# Patient Record
Sex: MALE | Employment: STUDENT | URBAN - METROPOLITAN AREA
[De-identification: names, ages, dates, MRNs, and addresses within clinical notes are randomized per-mention and may not be internally consistent; named-entity substitution may affect disease eponyms.]

---
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
## 2021-12-07 ENCOUNTER — OFFICE VISIT (OUTPATIENT)
Dept: FAMILY MEDICINE CLINIC | Facility: CLINIC | Age: 21
End: 2021-12-07
Payer: COMMERCIAL

## 2021-12-07 VITALS
TEMPERATURE: 97.1 F | HEIGHT: 69 IN | RESPIRATION RATE: 16 BRPM | WEIGHT: 214.6 LBS | HEART RATE: 84 BPM | OXYGEN SATURATION: 99 % | SYSTOLIC BLOOD PRESSURE: 122 MMHG | DIASTOLIC BLOOD PRESSURE: 62 MMHG | BODY MASS INDEX: 31.78 KG/M2

## 2021-12-07 DIAGNOSIS — S76.012A HIP STRAIN, LEFT, INITIAL ENCOUNTER: Primary | ICD-10-CM

## 2021-12-07 PROCEDURE — 99213 OFFICE O/P EST LOW 20 MIN: CPT | Performed by: FAMILY MEDICINE

## 2021-12-07 PROCEDURE — 1036F TOBACCO NON-USER: CPT | Performed by: FAMILY MEDICINE

## 2021-12-07 PROCEDURE — 3008F BODY MASS INDEX DOCD: CPT | Performed by: FAMILY MEDICINE

## 2021-12-07 PROCEDURE — 3725F SCREEN DEPRESSION PERFORMED: CPT | Performed by: FAMILY MEDICINE

## 2021-12-07 RX ORDER — NAPROXEN 500 MG/1
500 TABLET ORAL 2 TIMES DAILY WITH MEALS
Qty: 28 TABLET | Refills: 0 | Status: SHIPPED | OUTPATIENT
Start: 2021-12-07

## 2022-02-19 ENCOUNTER — HOSPITAL ENCOUNTER (OUTPATIENT)
Dept: RADIOLOGY | Facility: HOSPITAL | Age: 22
Discharge: HOME | End: 2022-02-19
Attending: FAMILY MEDICINE
Payer: COMMERCIAL

## 2022-02-19 DIAGNOSIS — S76.012A HIP STRAIN, LEFT, INITIAL ENCOUNTER: ICD-10-CM

## 2022-02-19 PROCEDURE — 73502 X-RAY EXAM HIP UNI 2-3 VIEWS: CPT

## 2022-03-03 ENCOUNTER — EVALUATION (OUTPATIENT)
Dept: PHYSICAL THERAPY | Facility: CLINIC | Age: 22
End: 2022-03-03
Payer: COMMERCIAL

## 2022-03-03 DIAGNOSIS — S76.012A HIP STRAIN, LEFT, INITIAL ENCOUNTER: ICD-10-CM

## 2022-03-03 DIAGNOSIS — S76.012D HIP STRAIN, LEFT, SUBSEQUENT ENCOUNTER: Primary | ICD-10-CM

## 2022-03-03 PROCEDURE — 97162 PT EVAL MOD COMPLEX 30 MIN: CPT | Performed by: PHYSICAL THERAPIST

## 2022-03-03 NOTE — PROGRESS NOTES
PT Evaluation     Today's date: 3/3/2022  Patient name: Laz Hui  : 2000  MRN: [id number]  Referring provider: Claven Brittle, MD  Dx:   Encounter Diagnosis     ICD-10-CM    1  Hip strain, left, subsequent encounter  S76 012D    2  Hip strain, left, initial encounter  S76 012A Ambulatory referral to Physical Therapy                  Assessment  Assessment details: Laz Hui is a 22 y o  male who presents to the clinic with complaints of left hip pain spanning 2+ months  The patient presents with the above listed impairments  He demonstrates decreased strength globally in his left lower extremity as well as noting discomfort in his anterior hip  He is limited with bending and lifting, getting in/out of his car, and running  He is eager to decrease his pain and should benefit from skilled physical therapy    Thank you for the referral       Impairments: abnormal muscle firing, abnormal or restricted ROM, activity intolerance, impaired physical strength, lacks appropriate home exercise program, pain with function, poor posture  and poor body mechanics  Understanding of Dx/Px/POC: good   Prognosis: good    Goals  Impairment Goals  - Decrease pain by 50% in 4 weeks  - Increase left lower extremity strength [employer] to 4+/5 in 4 weeks    Functional Goals  - Return to Prior Level of Function in 4 weeks  - Patient will be independent with HEP in 4 weeks  - Patient will be able to get in/out of his car with decreased pain in 4 weeks  - Patient will be able to bend and  a box with decreased pain in 4 weeks  - Patient will be able to run and pivot with decreased pain in 4 weeks      Plan  Patient would benefit from: skilled physical therapy  Planned modality interventions: cryotherapy, thermotherapy: hydrocollator packs and TENS  Planned therapy interventions: home exercise program, graded exercise, functional ROM exercises, flexibility, body mechanics training, postural training, patient education, therapeutic activities, therapeutic exercise, manual therapy, joint mobilization and neuromuscular re-education  Frequency: 2x week  Duration in weeks: 4  Treatment plan discussed with: patient and PCP        Subjective Evaluation    History of Present Illness  Mechanism of injury: HPI:  Patient notes left hip pain spanning over 2 months  Patient notes that he lifts weights 5x / week and feels he may have hurt his hip when lifting  He notes difficulty with bending, lifting, and running  He went to see his PCP where x-rays were ordered which were (-) for any pathology  He was then referred to physical therapy  Patient does not note any radicular symptoms or neurologic changes  Pain Location:  L hip  Occupation:  Works at [employer]  Prior Functional Limitations: Independent prior   AGG:  Squatting, bending, running, WB on single leg   Ease:  Pain Medication   Patient Goals:  "I want to be able to run    Pain  Current pain ratin  At best pain ratin  At worst pain ratin  Quality: "feels like a shocking in my hip"           Objective     Tenderness     Additional Tenderness Details  Tenderness to palpation anterior hip / hip flexor / proximal quad insertion     Active Range of Motion   Left Hip   Flexion: [place] [place]  External rotation (90/90): [facility] [place]  Internal rotation (90/90):  [facility] [place]    Additional Active Range of Motion Details  Lumbar Flexion:  Full w/ discomfort  Lumbar Extension:  75% w/o pain  Side bend:  50% bilaterally  Rotation:  WNL bilaterally     Strength/Myotome Testing     Lumbar     Right   Normal strength    Left Hip   Planes of Motion   Flexion: 4  Extension: 3+  Abduction: 3+    Left Knee   Extension: 4-    Left Ankle/Foot   Dorsiflexion: 5  Great toe extension: 5               Diagnosis:    Precautions:    Manuals        PROM        Mobs        IASTM                        There Ex        Bike        Prone Quad / Hip Flexor Stretch        Prone On Elbows / Prone Press Ups        It Band Stretch                        Neruo Re-Ed        Bridge        SL Hip ABD        Clamshell        Lateral Tap Down                                                                                       Ther Act                                         Modalities             CP PRN

## 2022-03-10 ENCOUNTER — OFFICE VISIT (OUTPATIENT)
Dept: PHYSICAL THERAPY | Facility: CLINIC | Age: 22
End: 2022-03-10
Payer: COMMERCIAL

## 2022-03-10 DIAGNOSIS — S76.012D HIP STRAIN, LEFT, SUBSEQUENT ENCOUNTER: Primary | ICD-10-CM

## 2022-03-10 DIAGNOSIS — S76.012A HIP STRAIN, LEFT, INITIAL ENCOUNTER: ICD-10-CM

## 2022-03-10 PROCEDURE — 97110 THERAPEUTIC EXERCISES: CPT

## 2022-03-10 PROCEDURE — 97112 NEUROMUSCULAR REEDUCATION: CPT

## 2022-03-10 PROCEDURE — 97140 MANUAL THERAPY 1/> REGIONS: CPT

## 2022-03-10 NOTE — PROGRESS NOTES
Daily Note     Today's date: 3/10/2022  Patient name: Kalpana Moore  : 2000  MRN: [id number]  Referring provider: Amelie Gallegos MD  Dx:   Encounter Diagnosis     ICD-10-CM    1  Hip strain, left, subsequent encounter  S76 012D    2  Hip strain, left, initial encounter  S76 012A                   Subjective: Patient reports, "I have been feeling it  It's gotten better at night, but I still feel it during the day"  Objective: See treatment diary below      Assessment: Tolerated treatment well  Patient would benefit from continued PT  Patient did well with his first PT session  He had difficulty performing hip abduction and hip ER exercises due to muscular weakness  Little to no relief noted with hip flexor stretching, but his pain improved with IASTM  Patient has back to back appointments;  assess his response at next session  Continue to progress as able  Plan: Continue per plan of care        Diagnosis:    Precautions:    Manuals 3/10        PROM Piriformis stretch, Hip flexor       Mobs        IASTM Hip Flexor                        There Ex        Bike        Prone Quad / Hip Flexor Stretch 30" x 3         Prone On Elbows / Prone Press Ups        It Band Stretch        Leg Press  140# 3x10                Neruo Re-Ed        Bridge 50% ROM  2x10        SL Hip ABD 10x        Clamshell Green 15x        Lateral Tap Down        SLB  With plyotoss: green 10x                                                                               Ther Act                                         Modalities             CP PRN

## 2022-03-11 ENCOUNTER — OFFICE VISIT (OUTPATIENT)
Dept: PHYSICAL THERAPY | Facility: CLINIC | Age: 22
End: 2022-03-11
Payer: COMMERCIAL

## 2022-03-11 DIAGNOSIS — S76.012A HIP STRAIN, LEFT, INITIAL ENCOUNTER: ICD-10-CM

## 2022-03-11 DIAGNOSIS — S76.012D HIP STRAIN, LEFT, SUBSEQUENT ENCOUNTER: Primary | ICD-10-CM

## 2022-03-11 PROCEDURE — 97140 MANUAL THERAPY 1/> REGIONS: CPT

## 2022-03-11 PROCEDURE — 97112 NEUROMUSCULAR REEDUCATION: CPT

## 2022-03-11 PROCEDURE — 97110 THERAPEUTIC EXERCISES: CPT

## 2022-03-11 NOTE — PROGRESS NOTES
Daily Note     Today's date: 3/11/2022  Patient name: Shona Lui  : 2000  MRN: [id number]  Referring provider: Nathalia Menezes MD  Dx:   Encounter Diagnosis     ICD-10-CM    1  Hip strain, left, subsequent encounter  S76 012D    2  Hip strain, left, initial encounter  S76 012A                   Subjective: Patient reports, "It felt a little better when I woke up this morning"  Objective: See treatment diary below      Assessment: Tolerated treatment well  Patient would benefit from continued PT  Patient notes that he felt better this morning when he woke up  He still notes hip flexor pain with prolonged SLS and with weight transfer from one leg to the other  He notes that his hip feels like it will "buckle" due to instability  Better able to tolerate glute strengthening today  His pain decreased from 10 to 3/10 with IASTM  He also had relief with standing hip flexor stretch  Continue to progress as able  Plan: Continue per plan of care        Diagnosis:    Precautions:    Manuals 3/10  3/11      PROM Piriformis stretch, Hip flexor       Mobs        IASTM Hip Flexor  Hip Flexor                       There Ex        Bike        Prone Quad / Hip Flexor Stretch 30" x 3   30" x 3    Standin" x 3       Prone On Elbows / Prone Press Ups  5" x 10       It Band Stretch        Leg Press  140# 3x10                Neruo Re-Ed        Bridge 50% ROM  2x10  50% ROM 10x       SL Hip ABD 10x        Clamshell Green 15x  [place], 3x10       Lateral Tap Down  2 in, 10x       SLB  With plyotoss: green 10x  With plyotoss: green 10x      Side steps   [place], 2 laps       Wobble board         RDL   Attempted - pain and instability                                                      Ther Act                                         Modalities             CP PRN

## 2022-03-15 ENCOUNTER — OFFICE VISIT (OUTPATIENT)
Dept: PHYSICAL THERAPY | Facility: CLINIC | Age: 22
End: 2022-03-15
Payer: COMMERCIAL

## 2022-03-15 DIAGNOSIS — S76.012A HIP STRAIN, LEFT, INITIAL ENCOUNTER: ICD-10-CM

## 2022-03-15 DIAGNOSIS — S76.012D HIP STRAIN, LEFT, SUBSEQUENT ENCOUNTER: Primary | ICD-10-CM

## 2022-03-15 PROCEDURE — 97110 THERAPEUTIC EXERCISES: CPT | Performed by: PHYSICAL THERAPIST

## 2022-03-15 PROCEDURE — 97112 NEUROMUSCULAR REEDUCATION: CPT | Performed by: PHYSICAL THERAPIST

## 2022-03-15 PROCEDURE — 97140 MANUAL THERAPY 1/> REGIONS: CPT | Performed by: PHYSICAL THERAPIST

## 2022-03-15 NOTE — PROGRESS NOTES
Daily Note     Today's date: 3/15/2022  Patient name: Kyle Rivera  : 2000  MRN: [id number]  Referring provider: Alvarez Velazquez MD  Dx:   Encounter Diagnosis     ICD-10-CM    1  Hip strain, left, subsequent encounter  S76 012D    2  Hip strain, left, initial encounter  S76 012A                   Subjective: "It's a little stiff today"       Objective: See treatment diary below      Assessment: Tolerated treatment well  Patient would benefit from continued PT  Focused on extensibility of the patient's hamstring, quad, hip flexor, and adductor  As worked on patient's strength of her hip abductors, hip extensors, and core  Continue to progress as able  Plan: Progress treatment as tolerated         Diagnosis:    Precautions:    Manuals 3/10  3/11 3/15     PROM Piriformis stretch, Hip flexor  Adductor     Mobs        IASTM Hip Flexor  Hip Flexor                       There Ex        Bike        Prone Quad / Hip Flexor Stretch 30" x 3   30" x 3    Standin" x 3  20" x 4 w/ bolster     Prone On Elbows / Prone Press Ups  5" x 10  10" x 5     It Band Stretch        Leg Press  140# 3x10        Hamstring Stretch   20" x 4                             Neruo Re-Ed        Bridge 50% ROM  2x10  50% ROM 10x  2x10     SL Hip ABD 10x   0# 2x10     Clamshell Green 15x  [place], 3x10  Red 2x10     Lateral Tap Down  2 in, 10x       SLB  With plyotoss: green 10x  With plyotoss: green 10x      Side steps   [place], 2 laps       Wobble board         RDL   Attempted - pain and instability       Multifidus Press        Hip Extension   2x10 w/ knee bent      Kneeling Rebounder   + foam and blue med ball 20x                            Ther Act                                         Modalities             CP PRN Pharmacy Vancomycin Initial Note  Date of Service 2019  Patient's  1961  58 year old, female    Indication: Sepsis    Current estimated CrCl = Estimated Creatinine Clearance: 46.6 mL/min (A) (based on SCr of 1.53 mg/dL (H)).    Creatinine for last 3 days  2019:  2:08 PM Creatinine 1.53 mg/dL    Recent Vancomycin Level(s) for last 3 days  No results found for requested labs within last 72 hours.      Vancomycin IV Administrations (past 72 hours)      No vancomycin orders with administrations in past 72 hours.                Nephrotoxins and other renal medications (From now, onward)    Start     Dose/Rate Route Frequency Ordered Stop    19 1533  vancomycin (VANCOCIN) 2,000 mg in sodium chloride 0.9 % 500 mL intermittent infusion      2,000 mg  over 2 Hours Intravenous EVERY 24 HOURS 19 1533      19 1458  norepinephrine (LEVOPHED) 16 mg in sodium chloride 0.9 % 250 mL infusion      0.03-0.4 mcg/kg/min × 100 kg  2.8-37.5 mL/hr  Intravenous CONTINUOUS 19 1458            Contrast Orders - past 72 hours (72h ago, onward)    None                Plan:  1.  Start vancomycin  2000 mg (20mg/kg) IV q24h.   2.  Goal Trough Level: 15-20 mg/L   3.  Pharmacy will check trough levels as appropriate in 1-3 Days.    4. Serum creatinine levels will be ordered daily for the first week of therapy and at least twice weekly for subsequent weeks.    5. Agency method utilized to dose vancomycin therapy: Method 2    Trevor Navarrete [facility]

## 2022-03-17 ENCOUNTER — OFFICE VISIT (OUTPATIENT)
Dept: PHYSICAL THERAPY | Facility: CLINIC | Age: 22
End: 2022-03-17
Payer: COMMERCIAL

## 2022-03-17 DIAGNOSIS — S76.012D HIP STRAIN, LEFT, SUBSEQUENT ENCOUNTER: Primary | ICD-10-CM

## 2022-03-17 DIAGNOSIS — S76.012A HIP STRAIN, LEFT, INITIAL ENCOUNTER: ICD-10-CM

## 2022-03-17 PROCEDURE — 97140 MANUAL THERAPY 1/> REGIONS: CPT

## 2022-03-17 PROCEDURE — 97112 NEUROMUSCULAR REEDUCATION: CPT

## 2022-03-17 PROCEDURE — 97110 THERAPEUTIC EXERCISES: CPT

## 2022-03-17 NOTE — PROGRESS NOTES
Daily Note     Today's date: 3/17/2022  Patient name: Julianna Amato  : 2000  MRN: [id number]  Referring provider: Paul Yu MD  Dx:   Encounter Diagnosis     ICD-10-CM    1  Hip strain, left, subsequent encounter  S76 012D    2  Hip strain, left, initial encounter  S76 012A                   Subjective: Patient reports, "I feel a little stiff today"  Objective: See treatment diary below      Assessment: Tolerated treatment well  Patient would benefit from continued PT  Continuing to work on core, hip and LE strengthening  Challenged patient with new exercises targeting his hamstrings, glutes and core  He fatigued with exercises, but was able to complete  Had relief with adductor stretching  Continue to progress posterior chain strengthening as able  Plan: Continue per plan of care        Diagnosis:    Precautions:    Manuals 3/10  3/11 3/15 3/17     PROM Piriformis stretch, Hip flexor  Adductor Adductor     Mobs        IASTM Hip Flexor  Hip Flexor                       There Ex        Bike    4 mins     Prone Quad / Hip Flexor Stretch 30" x 3   30" x 3    Standin" x 3  20" x 4 w/ bolster 20" x 4 w/ bolster    Prone On Elbows / Prone Press Ups  5" x 10  10" x 5     It Band Stretch        Leg Press  140# 3x10        Hamstring Stretch   20" x 4 20" x 4                             Neruo Re-Ed        Bridge 50% ROM  2x10  50% ROM 10x  2x10 Triple Threats 10x ea      SL Hip ABD 10x   0# 2x10     Clamshell Green 15x  [place], 3x10  Red 2x10     Lateral Tap Down  2 in, 10x       SLB  With plyotoss: green 10x  With plyotoss: green 10x      Side steps/ X-walks   [place], 2 laps       Wobble board         RDL   Attempted - pain and instability       Multifidus Press    14# 15x     Hip Extension   2x10 w/ knee bent      Kneeling Rebounder   + foam and blue med ball 20x + foam and blue med ball 20x    Fire Hydrants     [place], 2x10 ea     Donkey Kicks     [place], 10x ea     Quadruped Alternating UE/LE Stool Scoots     2 laps                                    Ther Act                                         Modalities             CP PRN

## 2022-03-21 ENCOUNTER — OFFICE VISIT (OUTPATIENT)
Dept: PHYSICAL THERAPY | Facility: CLINIC | Age: 22
End: 2022-03-21
Payer: COMMERCIAL

## 2022-03-21 DIAGNOSIS — S76.012D HIP STRAIN, LEFT, SUBSEQUENT ENCOUNTER: Primary | ICD-10-CM

## 2022-03-21 DIAGNOSIS — S76.012A HIP STRAIN, LEFT, INITIAL ENCOUNTER: ICD-10-CM

## 2022-03-21 PROCEDURE — 97110 THERAPEUTIC EXERCISES: CPT

## 2022-03-21 PROCEDURE — 97112 NEUROMUSCULAR REEDUCATION: CPT

## 2022-03-21 PROCEDURE — 97140 MANUAL THERAPY 1/> REGIONS: CPT

## 2022-03-21 NOTE — PROGRESS NOTES
Daily Note     Today's date: 3/21/2022  Patient name: Fady Green  : 2000  MRN: [id number]  Referring provider: Nasra Ortega MD  Dx:   Encounter Diagnosis     ICD-10-CM    1  Hip strain, left, subsequent encounter  S76 012D    2  Hip strain, left, initial encounter  S76 012A                   Subjective: Patient reports, "I was feeling stiff and had a sharper pain last night  It feels better this morning"  Objective: See treatment diary below      Assessment: Tolerated treatment well  Patient would benefit from continued PT  Patient did well with his program today  Continued with strengthening exerises, mostly targeting his posterior chain and hip abductors  Able to report some relief with adductor stretching  Muscular fatigue achieved with hamstring focused exercises  Will continue to progress strength as able  Plan: Continue per plan of care        Diagnosis:    Precautions:    Manuals  3/11 3/15 3/17  3/21   PROM   Adductor Adductor  Adductor    Mobs        IASTM  Hip Flexor                       There Ex        Bike    4 mins  5 mins    Prone Quad / Hip Flexor Stretch  30" x 3    Standin" x 3  20" x 4 w/ bolster 20" x 4 w/ bolster 20" x 4 w/ bolster   Prone On Elbows / Prone Press Ups  5" x 10  10" x 5     It Band Stretch        Leg Press      140# 3x10    Hamstring Stretch   20" x 4 20" x 4  20" x 4                            Neruo Re-Ed        Bridge  50% ROM 10x  2x10 [place] Threats 10x ea   Triple Threats 10x ea     SL Hip ABD   0# 2x10     Clamshell  [place], 3x10  Red 2x10     Lateral Tap Down  2 in, 10x       SLB   With plyotoss: green 10x      Side steps/ X-walks   [place], 2 laps    [place], 1 lap    Wobble board         RDL   Attempted - pain and instability       Multifidus Press    14# 15x  14# 2x10 ea    Hip Extension   2x10 w/ knee bent      Kneeling Rebounder   + foam and blue med ball 20x + foam and blue med ball 20x    Fire Hydrants     [place], 2x10 ea  [place], 2x10 ea Donkey Kicks     [place], 10x ea  [place], 2x10 ea    Quadruped Alternating UE/LE         Stool Scoots     2 laps  1 5 laps                                  Ther Act                                      Modalities            CP PRN

## 2022-03-24 ENCOUNTER — OFFICE VISIT (OUTPATIENT)
Dept: PHYSICAL THERAPY | Facility: CLINIC | Age: 22
End: 2022-03-24
Payer: COMMERCIAL

## 2022-03-24 DIAGNOSIS — S76.012A HIP STRAIN, LEFT, INITIAL ENCOUNTER: ICD-10-CM

## 2022-03-24 DIAGNOSIS — S76.012D HIP STRAIN, LEFT, SUBSEQUENT ENCOUNTER: Primary | ICD-10-CM

## 2022-03-24 PROCEDURE — 97140 MANUAL THERAPY 1/> REGIONS: CPT

## 2022-03-24 PROCEDURE — 97110 THERAPEUTIC EXERCISES: CPT

## 2022-03-24 PROCEDURE — 97112 NEUROMUSCULAR REEDUCATION: CPT

## 2022-03-24 NOTE — PROGRESS NOTES
Daily Note     Today's date: 3/24/2022  Patient name: Kalpana Moore  : 2000  MRN: [id number]  Referring provider: Amelie Gallegos MD  Dx:   Encounter Diagnosis     ICD-10-CM    1  Hip strain, left, subsequent encounter  S76 012D    2  Hip strain, left, initial encounter  S76 012A                   Subjective: Patient reports, "It's been feeling better  I can still feel it if I jog"  Objective: See treatment diary below      Assessment: Tolerated treatment well  Patient would benefit from continued PT  Patient still noting anterior hip pain with certain movements and stiffness in the morning  Patient demonstrates lateral hip and glute weakness, so continuing to challenge him with PRE's to strengthen these areas  He was able to perform [place] hamstrings, but fatigued quickly  Relief with adductor stretching as well  Continue to progress as able  Plan: Continue per plan of care        Diagnosis:    Precautions:    Manuals 3/24  3/15 3/17  3/21   PROM Adductor   Adductor Adductor  Adductor    Mobs        IASTM                        There Ex        Bike 5 mins    4 mins  5 mins    Prone Quad / Hip Flexor Stretch 20" x 4 w/ bolster  20" x 4 w/ bolster 20" x 4 w/ bolster 20" x 4 w/ bolster   Prone On Elbows / Prone Press Ups   10" x 5     It Band Stretch        Leg Press      140# 3x10    Hamstring Stretch 20" x 4   20" x 4 20" x 4  20" x 4                            Neruo Re-Ed        Bridge Triple Threats 10x ea    2x10 Triple Threats 10x ea   Triple Threats 10x ea     SL Hip ABD   0# 2x10     Clamshell   Red 2x10     Lateral Tap Down 6 in, 10x ea        SLB         Side steps/ X-walks  [place], 2 laps     [place], 1 lap    Wobble board         RDL         Multifidus Press    14# 15x  14# 2x10 ea    Hip Extension   2x10 w/ knee bent      Kneeling Rebounder + foam and blue med ball 20x  + foam and blue med ball 20x + foam and blue med ball 20x    Fire Hydrants  [place] 2x10 ea    [place], 2x10 ea  [place], 2x10 ea    Donkey Kicks  [place], 2x10 ea    [place], 10x ea  [place], 2x10 ea    Quadruped Alternating UE/LE         Stool Scoots     2 laps  1 5 laps    [place] Hamstrings  x5                              Ther Act                                      Modalities            CP PRN

## 2022-03-28 ENCOUNTER — OFFICE VISIT (OUTPATIENT)
Dept: PHYSICAL THERAPY | Facility: CLINIC | Age: 22
End: 2022-03-28
Payer: COMMERCIAL

## 2022-03-28 DIAGNOSIS — S76.012A HIP STRAIN, LEFT, INITIAL ENCOUNTER: ICD-10-CM

## 2022-03-28 DIAGNOSIS — S76.012D HIP STRAIN, LEFT, SUBSEQUENT ENCOUNTER: Primary | ICD-10-CM

## 2022-03-28 PROCEDURE — 97140 MANUAL THERAPY 1/> REGIONS: CPT

## 2022-03-28 PROCEDURE — 97110 THERAPEUTIC EXERCISES: CPT | Performed by: PHYSICAL THERAPIST

## 2022-03-28 PROCEDURE — 97112 NEUROMUSCULAR REEDUCATION: CPT

## 2022-03-28 NOTE — PROGRESS NOTES
Daily Note     Today's date: 3/28/2022  Patient name: Sylvie Sanz  : 2000  MRN: [id number]  Referring provider: Ree Butterfield MD  Dx:   Encounter Diagnosis     ICD-10-CM    1  Hip strain, left, subsequent encounter  S76 012D    2  Hip strain, left, initial encounter  S76 012A                   Subjective: Patient reports, "I've been feeling good  I did leg day on Friday"  Objective: See treatment diary below      Assessment: Tolerated treatment well  Patient would benefit from continued PT  Patient notes that he hasn't had any significant pain in his hip for the last couple of days  Continued with outlined strengthening PRE's with good tolerance  He needed cuing for limiting low back involvement with fire hydrants  Able to report feeling better with adductor stretching  Will continue to progress patient as able  Plan: Continue per plan of care        Diagnosis:    Precautions:    Manuals 3/24 3/28  3/17  3/21   PROM Adductor  Adductor   Adductor  Adductor    Mobs        IASTM                        There Ex        Bike 5 mins  5 mins  4 mins  5 mins    Prone Quad / Hip Flexor Stretch 20" x 4 w/ bolster 20"x4 w/ bolster  20" x 4 w/ bolster 20" x 4 w/ bolster   Prone On Elbows / Prone Press Ups        It Band Stretch        Leg Press   130# 3x10    140# 3x10    Hamstring Stretch 20" x 4  20"x4  20" x 4  20" x 4                            Neruo Re-Ed        Bridge Triple Threats 10x ea   Triple Threats 15x ea   Triple Threats 10x ea   Triple Threats 10x ea     SL Hip ABD        Clamshell        Lateral Tap Down 6 in, 10x ea        SLB         Side steps/ X-walks  [place], 2 laps  Red 2 laps    [place], 1 lap    Wobble board         RDL         Multifidus Press    14# 15x  14# 2x10 ea    Hip Extension        Kneeling Rebounder + foam and blue med ball 20x   + foam and blue med ball 20x    Fire Hydrants  [place] 2x10 ea  [place] 2x10  [place], 2x10 ea  [place], 2x10 ea    Donkey Kicks  [place], 2x10 ea  [place] 2x10 [place], 10x ea  [place], 2x10 ea    Quadruped Alternating UE/LE         Stool Scoots     2 laps  1 5 laps    [place] Hamstrings  x5  x5                            Ther Act                                   Modalities            CP PRN

## 2022-03-31 ENCOUNTER — EVALUATION (OUTPATIENT)
Dept: PHYSICAL THERAPY | Facility: CLINIC | Age: 22
End: 2022-03-31
Payer: COMMERCIAL

## 2022-03-31 DIAGNOSIS — S76.012A HIP STRAIN, LEFT, INITIAL ENCOUNTER: ICD-10-CM

## 2022-03-31 DIAGNOSIS — S76.012D HIP STRAIN, LEFT, SUBSEQUENT ENCOUNTER: Primary | ICD-10-CM

## 2022-03-31 PROCEDURE — 97110 THERAPEUTIC EXERCISES: CPT | Performed by: PHYSICAL THERAPIST

## 2022-03-31 PROCEDURE — 97530 THERAPEUTIC ACTIVITIES: CPT | Performed by: PHYSICAL THERAPIST

## 2022-03-31 PROCEDURE — 97112 NEUROMUSCULAR REEDUCATION: CPT | Performed by: PHYSICAL THERAPIST

## 2022-03-31 NOTE — PROGRESS NOTES
PT Re-Evaluation     Today's date: 3/31/2022  Patient name: Vasiliy Mcleod  : 2000  MRN: [id number]  Referring provider: Neto Spaulding MD  Dx:   Encounter Diagnosis     ICD-10-CM    1  Hip strain, left, subsequent encounter  S76 012D    2  Hip strain, left, initial encounter  S76 012A                   Assessment  Assessment details: Patient demonstrates improved ROM, improved strength, and improved functional ability since beginning PT treatment  Patient continues to demonstrate strength deficits and functional deficits including difficulty transferring out of a car and inability in performing jogging and running activities  Patient would benefit from continued skilled PT treatment to address remaining deficits and to facilitate return to prior level of function  Goals  Impairment Goals  - Decrease pain by 50% in 4 weeks - met  - Increase left lower extremity strength golbally to 4+/5 in 4 weeks - partially met    Functional Goals  - Return to Prior Level of Function in 4 weeks - partially met  - Patient will be independent with HEP in 4 weeks - met  - Patient will be able to get in/out of his car with decreased pain in 4 weeks - partially met  - Patient will be able to bend and  a box with decreased pain in 4 weeks - met  - Patient will be able to run and pivot with decreased pain in 4 weeks - not met    Plan  Planned therapy interventions: manual therapy, neuromuscular re-education, patient education, strengthening, stretching, therapeutic activities, therapeutic exercise and home exercise program  Frequency: 2x week  Duration in visits: 12  Duration in weeks: 6  Plan of Care beginning date: 3/31/2022  Plan of Care expiration date: 2022  Treatment plan discussed with: patient        Subjective Evaluation    History of Present Illness  Mechanism of injury: Patient states he feels approximately 60% improved since beginning PT treatment    Patient states some continued pain and difficulty getting out of his car  Patient states he is currently not able to jog or run secondary to pain  Pain  Current pain rating: 3  At best pain ratin  At worst pain ratin          Objective     General Comments:      Hip Comments   Active Range of Motion   Left Hip   Flexion: [facility]  External rotation (90/90): [facility] [place]  Internal rotation (90/90):  [facility] [place]    Additional Active Range of Motion Details  Lumbar Flexion:  WNL  Lumbar Extension: WNL  Side bend:  WNL bilaterally  Rotation:  WNL bilaterally      Strength/Myotome Testing      Lumbar      Right   Normal strength     Left Hip   Planes of Motion   Flexion: 4  Extension: 4  Abduction: 4     Left Knee   Extension: 4  Flexion: 4+    Left Ankle/Foot   Dorsiflexion: 5  Great toe extension: 5              Diagnosis:    Precautions:    Manuals 3/24 3/28  3/31 3/17  3/21   PROM Adductor  Adductor   NP - WNL Adductor  Adductor    Mobs             IASTM             Re-eval      KK                     There Ex             Bike 5 mins  5 mins  5 min 4 mins  5 mins    Prone Quad / Hip Flexor Stretch 20" x 4 w/ bolster 20"x4 w/ bolster  20"x4 w/ bolster 20" x 4 w/ bolster 20" x 4 w/ bolster   Prone On Elbows / Prone Press Ups            It Band Stretch             Leg Press    130# 3x10  130# 3x10    140# 3x10    Hamstring Stretch 20" x 4  20"x4  20"x4 20" x 4  20" x 4    SLR flex        3x10                                   Neruo Re-Ed             Bridge Triple Threats 10x ea   Triple Threats 15x ea   Triple Threats 15x ea  Triple Threats 10x ea   Triple Threats 10x ea     SL Hip ABD             Clamshell             Lateral Tap Down 6 in, 10x ea            SLB              Side steps/ X-walks  [place], 2 laps  Red 2 laps   Red 2 laps    [place], 1 lap    Wobble board              RDL              Multifidus Press       14# 15x  14# 2x10 ea    Hip Extension             Kneeling Rebounder + foam and blue med ball 20x     + foam and blue med ball 20x     Fire Hydrants  [place] 2x10 ea  [place] 2x10  [place] 2x10 [place], 2x10 ea  [place], 2x10 ea    Donkey Kicks  [place], 2x10 ea  [place] 2x10  [place] 2x10 [place], 10x ea  [place], 2x10 ea    Quadruped Alternating UE/LE              Stool Scoots       2 laps  1 5 laps    [place] Hamstrings  x5  x5   5x                                    Ther Act                                         Modalities             CP PRN

## 2022-04-04 ENCOUNTER — APPOINTMENT (OUTPATIENT)
Dept: PHYSICAL THERAPY | Facility: CLINIC | Age: 22
End: 2022-04-04
Payer: COMMERCIAL

## 2022-04-06 ENCOUNTER — OFFICE VISIT (OUTPATIENT)
Dept: PHYSICAL THERAPY | Facility: CLINIC | Age: 22
End: 2022-04-06
Payer: COMMERCIAL

## 2022-04-06 DIAGNOSIS — S76.012A HIP STRAIN, LEFT, INITIAL ENCOUNTER: ICD-10-CM

## 2022-04-06 DIAGNOSIS — S76.012D HIP STRAIN, LEFT, SUBSEQUENT ENCOUNTER: Primary | ICD-10-CM

## 2022-04-06 PROCEDURE — 97140 MANUAL THERAPY 1/> REGIONS: CPT

## 2022-04-06 PROCEDURE — 97112 NEUROMUSCULAR REEDUCATION: CPT

## 2022-04-06 PROCEDURE — 97530 THERAPEUTIC ACTIVITIES: CPT

## 2022-04-06 PROCEDURE — 97110 THERAPEUTIC EXERCISES: CPT

## 2022-04-06 NOTE — PROGRESS NOTES
Daily Note     Today's date: 2022  Patient name: Ldai Castillo  : 2000  MRN: [id number]  Referring provider: Akua Rodrigez MD  Dx:   Encounter Diagnosis     ICD-10-CM    1  Hip strain, left, subsequent encounter  S76 012D    2  Hip strain, left, initial encounter  S76 012A                   Subjective: Patient reports, "My hip feels stiff  It doesn't hurt as much, but I am fine working out"  Objective: See treatment diary below      Assessment: Tolerated treatment well  Patient would benefit from continued PT  Continuing to work on [address] [employer] and hip strengthening, as patient demonstrates weakness throughout his hips  He was able to report some relief with hip flexor stretching  Still noting discomfort and "pinching" in the anterior hip with transition of weight bearing from DL to SL on the left  Continue to progress strength as able  Plan: Continue per plan of care        Diagnosis:    Precautions:    Manuals 3/24 3/28  3/31 4/6     PROM Adductor  Adductor   NP - WNL Hip Flexor - modified Dimitris Winter           IASTM           Re-eval      KK                 There Ex           Bike 5 mins  5 mins  5 min 5 mins     Prone Quad / Hip Flexor Stretch 20" x 4 w/ bolster 20"x4 w/ bolster  20"x4 w/ bolster 20"x4 w/ bolster    Prone On Elbows / Prone Press Ups          It Band Stretch           Leg Press    130# 3x10  130# 3x10  190# 3x10     Hamstring Stretch 20" x 4  20"x4  20"x4     SLR flex        3x10                             Neruo Re-Ed           Bridge Triple Threats 10x ea   Triple Threats 15x ea   Triple Threats 15x ea  Triple Threats 15x ea    SL Hip ABD           Clamshell           Lateral Tap Down 6 in, 10x ea          SLB            Side steps/ X-walks  [place], 2 laps  Red 2 laps   Red 2 laps  Green 1 lap     Wobble board            RDL            Multifidus Press           Hip Extension           Kneeling Rebounder + foam and blue med ball 20x         Fire Hydrants  [place] 2x10 ea [place] 2x10  [place] 2x10 Red 3x15  ea     Donkey Kicks  [place], 2x10 ea  [place] 2x10  [place] 2x10 Red 3x15 ea     Quadruped Alternating UE/LE            Stool Scoots       2 laps     [place] Hamstrings  x5  x5   5x                              Ther Act                                   Modalities           CP PRN

## 2022-04-07 ENCOUNTER — OFFICE VISIT (OUTPATIENT)
Dept: PHYSICAL THERAPY | Facility: CLINIC | Age: 22
End: 2022-04-07
Payer: COMMERCIAL

## 2022-04-07 DIAGNOSIS — S76.012A HIP STRAIN, LEFT, INITIAL ENCOUNTER: ICD-10-CM

## 2022-04-07 DIAGNOSIS — S76.012D HIP STRAIN, LEFT, SUBSEQUENT ENCOUNTER: Primary | ICD-10-CM

## 2022-04-07 PROCEDURE — 97112 NEUROMUSCULAR REEDUCATION: CPT | Performed by: PHYSICAL THERAPIST

## 2022-04-07 PROCEDURE — 97110 THERAPEUTIC EXERCISES: CPT | Performed by: PHYSICAL THERAPIST

## 2022-04-07 NOTE — PROGRESS NOTES
Daily Note     Today's date: 2022  Patient name: Patria Baptiste  : 2000  MRN: [id number]  Referring provider: Serge Newell MD  Dx:   Encounter Diagnosis     ICD-10-CM    1  Hip strain, left, subsequent encounter  S76 012D    2  Hip strain, left, initial encounter  S76 012A                   Subjective: Patient stated moderate stiffness prior to treatment session  Objective: See treatment diary below      Assessment: Patient demonstrated mild pain in hip with X-walks; stated some decrease in stiffness after manual hip flexor stretch  Plan: Continue per plan of care  Diagnosis:    Precautions:    Manuals 3/24 3/28  3/31 4/6  4/7   PROM Adductor  Adductor   NP - WNL Hip Flexor - modified Dimitris  Hip flexor in sidelying   Mobs           IASTM           Re-eval      KK                 There Ex           Bike 5 mins  5 mins  5 min 5 mins   5 min   Prone Quad / Hip Flexor Stretch 20" x 4 w/ bolster 20"x4 w/ bolster  20"x4 w/ bolster 20"x4 w/ bolster 20"x4 w/ bolster   Prone On Elbows / Prone Press Ups          It Band Stretch           Leg Press    130# 3x10  130# 3x10  190# 3x10  190# 3x10    Hamstring Stretch 20" x 4  20"x4  20"x4     SLR flex        3x10                             Neruo Re-Ed           Bridge Triple Threats 10x ea   Triple Threats 15x ea   Triple Threats 15x ea  Triple Threats 15x ea Triple Threats 15x ea   SL Hip ABD           Clamshell           Lateral Tap Down 6 in, 10x ea          SLB            Side steps/ X-walks  [place], 2 laps  Red 2 laps   Red 2 laps  Green 1 lap  Green 2 laps    Wobble board balance        1 5 min ea     RDL            Multifidus Press           Hip Extension           Kneeling Rebounder + foam and blue med ball 20x         Fire Hydrants  [place] 2x10 ea  [place] 2x10  [place] 2x10 Red 3x15  ea  Red 3x15 ea    Donkey Kicks  [place], 2x10 ea  [place] 2x10  [place] 2x10 Red 3x15 ea  Red 3x15 ea    Quadruped Alternating UE/LE            Stool Scoots       2 laps 2 laps   [place] Hamstrings  x5  x5   5x                              Ther Act                                   Modalities           CP PRN

## 2022-04-11 ENCOUNTER — APPOINTMENT (OUTPATIENT)
Dept: PHYSICAL THERAPY | Facility: CLINIC | Age: 22
End: 2022-04-11
Payer: COMMERCIAL

## 2022-04-13 ENCOUNTER — OFFICE VISIT (OUTPATIENT)
Dept: PHYSICAL THERAPY | Facility: CLINIC | Age: 22
End: 2022-04-13
Payer: COMMERCIAL

## 2022-04-13 DIAGNOSIS — S76.012D HIP STRAIN, LEFT, SUBSEQUENT ENCOUNTER: Primary | ICD-10-CM

## 2022-04-13 DIAGNOSIS — S76.012A HIP STRAIN, LEFT, INITIAL ENCOUNTER: ICD-10-CM

## 2022-04-13 PROCEDURE — 97110 THERAPEUTIC EXERCISES: CPT | Performed by: PHYSICAL THERAPIST

## 2022-04-13 PROCEDURE — 97112 NEUROMUSCULAR REEDUCATION: CPT | Performed by: PHYSICAL THERAPIST

## 2022-04-13 NOTE — PROGRESS NOTES
Daily Note     Today's date: 2022  Patient name: Shelvy Leventhal  : 2000  MRN: [id number]  Referring provider: Elisa Sprague MD  Dx:   Encounter Diagnosis     ICD-10-CM    1  Hip strain, left, subsequent encounter  S76 012D    2  Hip strain, left, initial encounter  S76 012A                   Subjective: "I only feel it if I put a lot of weight on it  I tried jogging a little bit and it I could feel it just a bit"       Objective: See treatment diary below      Assessment: Tolerated treatment well  Patient would benefit from continued PT  Patient did well with new PREs today  Noted some discomfort with star taps, but able to complete lunges on BOSU ball  Continue to work on posterior chain strengthening while incorporating functional movements that consist of hip flexion in a pain free range  Plan: Progress treatment as tolerated  Diagnosis:    Precautions:    Manuals 4/13   3/31 4/6  4/7   PROM    NP - WNL Hip Flexor - modified Dimitris  Hip flexor in sidelying   Mobs         IASTM         Re-eval    KK               There Ex         Bike 5 min    5 min 5 mins   5 min   Prone Quad / Hip Flexor Stretch 20" x 4 w/ bolster    20"x4 w/ bolster 20"x4 w/ bolster 20"x4 w/ bolster   Prone On Elbows / Prone Press Ups        It Band Stretch         Leg Press  195# 3x10  130# 3x10  190# 3x10  190# 3x10    Hamstring Stretch    20"x4     SLR flex      3x10                         Neruo Re-Ed         Bridge Triple Threats 15x   Triple Threats 15x ea  Triple Threats 15x ea Triple Threats 15x ea   SL Hip ABD         Clamshell         Lateral Tap Down         SLB          Side steps/ X-walks  Green 2 laps   Red 2 laps  Green 1 lap  Green 2 laps    Wobble board balance      1 5 min ea     RDL          Multifidus Press         Hip Extension         Kneeling Rebounder         Fire Hydrants  Red 3x15 ea   [place] 2x10 Red 3x15  ea  Red 3x15 ea    Donkey Kicks  Red 3x15 ea   [place] 2x10 Red 3x15 ea  Red 3x15 ea    Quadruped Alternating UE/LE          Stool Scoots     2 laps   2 laps   [place] Hamstrings     5x     Star Taps 3x         BOSU Lunge 2x10                                  Ther Act                             Modalities         CP PRN

## 2022-04-14 ENCOUNTER — OFFICE VISIT (OUTPATIENT)
Dept: PHYSICAL THERAPY | Facility: CLINIC | Age: 22
End: 2022-04-14
Payer: COMMERCIAL

## 2022-04-14 DIAGNOSIS — S76.012D HIP STRAIN, LEFT, SUBSEQUENT ENCOUNTER: Primary | ICD-10-CM

## 2022-04-14 DIAGNOSIS — S76.012A HIP STRAIN, LEFT, INITIAL ENCOUNTER: ICD-10-CM

## 2022-04-14 PROCEDURE — 97112 NEUROMUSCULAR REEDUCATION: CPT

## 2022-04-14 PROCEDURE — 97110 THERAPEUTIC EXERCISES: CPT

## 2022-04-14 NOTE — PROGRESS NOTES
Daily Note     Today's date: 2022  Patient name: Minesh Edward  : 2000  MRN: [id number]  Referring provider: Laura Ramos MD  Dx:   Encounter Diagnosis     ICD-10-CM    1  Hip strain, left, subsequent encounter  S76 012D    2  Hip strain, left, initial encounter  S76 012A                   Subjective: Patient reports, "I am feeling better"  Objective: See treatment diary below      Assessment: Tolerated treatment well  Patient would benefit from continued PT  Patient did well with his session today  He notes less stiffness and pain overall, but does note occasional "pinching" at the anterior hip with weight offloading  He was challenged with higher resistance for donkey kicks and fire hydrants and maintaining a squat with SLB ball toss  Will continue to progress posterior chain strength and functional movement as able  Plan: Continue per plan of care  Diagnosis:    Precautions:    Manuals    PROM    Hip Flexor - modified Dimitris  Hip flexor in sidelying   Mobs        IASTM        Re-eval                 There Ex        Bike 5 min  5 min   5 mins   5 min   Prone Quad / Hip Flexor Stretch 20" x 4 w/ bolster  20" x 4 w/ bolster   20"x4 w/ bolster 20"x4 w/ bolster   Prone On Elbows / Prone Press Ups        It Band Stretch        Leg Press  195# 3x10 195# 3x10  190# 3x10  190# 3x10    Hamstring Stretch        SLR flex                          Neruo Re-Ed        Bridge Triple Threats 15x Triple Threats 20x ea   Triple Threats 15x ea Triple Threats 15x ea   SL Hip ABD        Clamshell        Lateral Tap Down        SLB   On foam with  mini squat with ball toss: red, 20x       Side steps/ Timmothy Whitney 2 laps + mini squats 2 laps   Green 1 lap  Green 2 laps    Wobble board balance     1 5 min ea     RDL         Multifidus Press        Hip Extension        Kneeling Rebounder        Fire Hydrants  Red 3x15 ea Green 2x10 ea  Red 3x15  ea  Red 3x15 ea    [place] 3x15 ea Green 2x10 ea  Red 3x15 ea  Red 3x15 ea    [place] stomps   26# 2x10       Stool Scoots     2 laps   2 laps   [place] Hamstrings         Star Taps 3x  5x        BOSU Lunge 2x10 Lunge 2x10 with 15#                                  Ther Act                             Modalities         CP PRN

## 2022-04-18 ENCOUNTER — OFFICE VISIT (OUTPATIENT)
Dept: PHYSICAL THERAPY | Facility: CLINIC | Age: 22
End: 2022-04-18
Payer: COMMERCIAL

## 2022-04-18 DIAGNOSIS — S76.012A HIP STRAIN, LEFT, INITIAL ENCOUNTER: ICD-10-CM

## 2022-04-18 DIAGNOSIS — S76.012D HIP STRAIN, LEFT, SUBSEQUENT ENCOUNTER: Primary | ICD-10-CM

## 2022-04-18 PROCEDURE — 97112 NEUROMUSCULAR REEDUCATION: CPT

## 2022-04-18 PROCEDURE — 97110 THERAPEUTIC EXERCISES: CPT

## 2022-04-18 NOTE — PROGRESS NOTES
Daily Note     Today's date: 2022  Patient name: Radha Juarez  : 2000  MRN: [id number]  Referring provider: Mukesh Caicedo MD  Dx:   Encounter Diagnosis     ICD-10-CM    1  Hip strain, left, subsequent encounter  S76 012D    2  Hip strain, left, initial encounter  S76 012A                   Subjective: Patient reports, "It was a little stiff this morning"  Objective: See treatment diary below      Assessment: Tolerated treatment well  Patient would benefit from continued PT  Patient did well with his session today, as we were able to add in new progressions and exercises  He was challenged with higher levels tasks, especially the SL SB rows  He was able to report improved stiffness by the end of the session  Will continue to progress core and hip strengthening as able  Plan: Continue per plan of care  Diagnosis:    Precautions:    Manuals    PROM     Hip flexor in sidelying   Mobs        IASTM        Re-eval                 There Ex        Bike 5 min  5 min  5 min    5 min   Prone Quad / Hip Flexor Stretch 20" x 4 w/ bolster  20" x 4 w/ bolster  20" x 4 w/ bolster   20"x4 w/ bolster   Prone On Elbows / Prone Press Ups        It Band Stretch        Leg Press  195# 3x10 195# 3x10 190# 3x10   190# 3x10    Hamstring Stretch        SLR flex                          Neruo Re-Ed        Bridge Triple Threats 15x Triple Threats 20x ea  Triple Threats 20x ea   Triple Threats 15x ea   SL Hip ABD        Clamshell        Lateral Tap Down        SLB   On foam with  mini squat with ball toss: red, 20x       Side steps/ [place]bert Bone 2 laps + mini squats 2 laps  + mini squats, green 2 laps   Green 2 laps    Wobble board balance     1 5 min ea     Standing Chops    10# 2x10 ea      Multifidus Press        Seated Rows On SB with perturbations    SL 40# 3x 5 ea leg with perturbations on 5th hold      Kneeling Rebounder        Fire Hydrants  Red 3x15 ea Green 2x10 ea Green 2x10 ea  Red 3x15 ea    Donkey Kicks  Red 3x15 ea Green 2x10 ea Green 2x10 ea  Red 3x15 ea    [place] stomps   26# 2x10       Stool Scoots       2 laps   [place] Hamstrings         Star Taps 3x  5x        BOSU Lunge 2x10 Lunge 2x10 with 15#  Lunge 2x10 with 25#                                 Ther Act                             Modalities         CP PRN

## 2022-04-21 ENCOUNTER — OFFICE VISIT (OUTPATIENT)
Dept: PHYSICAL THERAPY | Facility: CLINIC | Age: 22
End: 2022-04-21
Payer: COMMERCIAL

## 2022-04-21 DIAGNOSIS — S76.012D HIP STRAIN, LEFT, SUBSEQUENT ENCOUNTER: Primary | ICD-10-CM

## 2022-04-21 DIAGNOSIS — S76.012A HIP STRAIN, LEFT, INITIAL ENCOUNTER: ICD-10-CM

## 2022-04-21 PROCEDURE — 97110 THERAPEUTIC EXERCISES: CPT

## 2022-04-21 PROCEDURE — 97112 NEUROMUSCULAR REEDUCATION: CPT

## 2022-04-21 PROCEDURE — 97530 THERAPEUTIC ACTIVITIES: CPT

## 2022-04-21 NOTE — PROGRESS NOTES
Daily Note     Today's date: 2022  Patient name: Hemant Vasquez  : 2000  MRN: [id number]  Referring provider: Lorri Gray MD  Dx:   Encounter Diagnosis     ICD-10-CM    1  Hip strain, left, subsequent encounter  S76 012D    2  Hip strain, left, initial encounter  S76 012A                   Subjective: Patient reports, "I feel like its getting better"  Objective: See treatment diary below      Assessment: Tolerated treatment well  Patient would benefit from continued PT  Patient continuing to do well with his rehab  Added in core focused exercises this session with good tolerance  Still noting hip discomfort with offloading, but this is slowly improving  Muscular fatigue was achieved s/p session  Continue to progress patient as able  Plan: Continue per plan of care        Diagnosis:    Precautions:    Manuals     PROM        Mobs        IASTM        Re-eval                 There Ex        Bike 5 min  5 min  5 min  5 min     Prone Quad / Hip Flexor Stretch 20" x 4 w/ bolster  20" x 4 w/ bolster  20" x 4 w/ bolster  20" x 4 w/ bolster     Prone On Elbows / Prone Press Ups        It Band Stretch        Leg Press  195# 3x10 195# 3x10 190# 3x10  Held     Hamstring Stretch        SLR flex                          Neruo Re-Ed        Bridge Triple Threats 15x Triple Threats 20x ea  Triple Threats 20x ea  Triple Threats 20x ea     SL Hip ABD        Clamshell        Lateral Tap Down        SLB   On foam with  mini squat with ball toss: red, 20x       Side steps/ Avila Ding 2 laps + mini squats 2 laps  + mini squats, green 2 laps  + mini squats, blue  2 laps     Wobble board balance        Standing Chops    10# 2x10 ea  10# 2x10 ea     Multifidus Press    Walkouts 15# 3 laps ea     Seated Rows On SB with perturbations    SL 40# 3x 5 ea leg with perturbations on 5th hold      Kneeling Rebounder        Fire Hydrants  Red 3x15 ea Green 2x10 ea Green 2x10 ea     [employer] Red 3x15 ea Green 2x10 ea Green 2x10 ea     [place] stomps   26# 2x10       BOSU Squat    with side toss with blue med ball 15x ea    [place] Hamstrings         Star Taps 3x  5x        BOSU Lunge 2x10 Lunge 2x10 with 15#  Lunge 2x10 with 25#      BOSU Hole in one     3x3    Kneeling rebounder tosses     Blue 2x10                Ther Act                             Modalities         CP PRN

## 2022-04-25 ENCOUNTER — OFFICE VISIT (OUTPATIENT)
Dept: PHYSICAL THERAPY | Facility: CLINIC | Age: 22
End: 2022-04-25
Payer: COMMERCIAL

## 2022-04-25 DIAGNOSIS — S76.012A HIP STRAIN, LEFT, INITIAL ENCOUNTER: ICD-10-CM

## 2022-04-25 DIAGNOSIS — S76.012D HIP STRAIN, LEFT, SUBSEQUENT ENCOUNTER: Primary | ICD-10-CM

## 2022-04-25 PROCEDURE — 97112 NEUROMUSCULAR REEDUCATION: CPT | Performed by: PHYSICAL THERAPIST

## 2022-04-25 PROCEDURE — 97110 THERAPEUTIC EXERCISES: CPT | Performed by: PHYSICAL THERAPIST

## 2022-04-25 NOTE — PROGRESS NOTES
Daily Note     Today's date: 2022  Patient name: Magalie Cole  : 2000  MRN: [id number]  Referring provider: Nora Wall MD  Dx:   Encounter Diagnosis     ICD-10-CM    1  Hip strain, left, subsequent encounter  S76 012D    2  Hip strain, left, initial encounter  S76 012A                   Subjective: "The front of the hip pain is much better  I can feel it on the outside more  Objective: See treatment diary below      Assessment: Tolerated treatment well  Patient would benefit from continued PT  Attempted RDLs today and patient noted some slight discomfort in his anterior hip  Patient notes feeling less stable on his L LE  Encouraged patient to continue to strengthen his hip at the gym with smaller ROM and lighter weights  Continue to progress as able  Plan: Progress treatment as tolerated         Diagnosis:    Precautions:    Manuals    PROM        Mobs        IASTM        Re-eval                 There Ex        Bike 5 min  5 min  5 min  5 min  5 min   Prone Quad / Hip Flexor Stretch 20" x 4 w/ bolster  20" x 4 w/ bolster  20" x 4 w/ bolster  20" x 4 w/ bolster  20" x 4 w/ bolster   Prone On Elbows / Prone Press Ups        It Band Stretch        Leg Press  195# 3x10 195# 3x10 190# 3x10  Held     Hamstring Stretch        SLR flex                          Neruo Re-Ed        Bridge Triple Threats 15x Triple Threats 20x ea  Triple Threats 20x ea  Triple Threats 20x ea  Triple Threats 20x ea    Hip Thrust @ bench 20# x10   SL Hip ABD        Clamshell        Lateral Tap Down        SLB   On foam with  mini squat with ball toss: red, 20x       RDL     15# kettlebell 12" box x10   Side steps/ X-walks  Green 2 laps + mini squats 2 laps  + mini squats, green 2 laps  + mini squats, blue  2 laps  + mini squats, black 2 laps   Wobble board balance        Standing Chops    10# 2x10 ea  10# 2x10 ea  10# 2x10 ea   Multifidus Press    Walkouts 15# 3 laps ea Walkouts 15# 3 laps ea    Seated Rows On SB with perturbations    SL 40# 3x 5 ea leg with perturbations on 5th hold      Kneeling Rebounder        Fire Hydrants  Red 3x15 ea Green 2x10 ea Green 2x10 ea     Donkey Kicks  Red 3x15 ea Green 2x10 ea Green 2x10 ea     [place] stomps   26# 2x10       BOSU Squat    with side toss with blue med ball 15x ea    [place] Hamstrings         Star Taps 3x  5x        BOSU Lunge 2x10 Lunge 2x10 with 15#  Lunge 2x10 with 25#      BOSU Hole in one     3x3 x6   Kneeling rebounder tosses     Blue 2x10                Ther Act                             Modalities         CP PRN

## 2022-04-28 ENCOUNTER — OFFICE VISIT (OUTPATIENT)
Dept: PHYSICAL THERAPY | Facility: CLINIC | Age: 22
End: 2022-04-28
Payer: COMMERCIAL

## 2022-04-28 DIAGNOSIS — S76.012D HIP STRAIN, LEFT, SUBSEQUENT ENCOUNTER: Primary | ICD-10-CM

## 2022-04-28 DIAGNOSIS — S76.012A HIP STRAIN, LEFT, INITIAL ENCOUNTER: ICD-10-CM

## 2022-04-28 PROCEDURE — 97112 NEUROMUSCULAR REEDUCATION: CPT

## 2022-04-28 PROCEDURE — 97110 THERAPEUTIC EXERCISES: CPT

## 2022-04-28 NOTE — PROGRESS NOTES
Daily Note     Today's date: 2022  Patient name: Ilsa Ruggiero  : 2000  MRN: [id number]  Referring provider: Lizet eB MD  Dx:   Encounter Diagnosis     ICD-10-CM    1  Hip strain, left, subsequent encounter  S76 012D    2  Hip strain, left, initial encounter  S76 012A                   Subjective: Patient reports, 'It was a little stiff this morning"  Objective: See treatment diary below      Assessment: Tolerated treatment well  Patient would benefit from continued PT  Patient continuing to do well with his rehab  He was able to perform progressions without complaints of pain  Does note pinching in the anterior hip with SL deadlifts, but he has been able to work on this in the gym  Will continue to progress overall strength as able  Plan: Continue per plan of care        Diagnosis:    Precautions:    Manuals    PROM        Mobs        IASTM        Re-eval                 There Ex        Bike 5 mins  5 min  5 min  5 min   Prone Quad / Hip Flexor Stretch 20" x 4 w/ bolster   20" x 4 w/ bolster  20" x 4 w/ bolster  20" x 4 w/ bolster   Prone On Elbows / Prone Press Ups        It Band Stretch        Leg Press    190# 3x10  Held     Hamstring Stretch        SLR flex                          Neruo Re-Ed        Bridge Triple Threats 20x ea    Hip Thrust @ bench 40# x15   Triple Threats 20x ea  Triple Threats 20x ea  Triple Threats 20x ea    Hip Thrust @ bench 20# x10   SL Hip ABD        Clamshell        Lateral Tap Down        SLB         RDL 15# kettlebell 12" box x15    15# kettlebell 12" box x10   Side steps/ X-walks  + mini squats, black 2 laps  + mini squats, green 2 laps  + mini squats, blue  2 laps  + mini squats, black 2 laps   Excursions  10x ea lateral        Standing Chops  Half kneeling on foam: 12# 15x ea   10# 2x10 ea  10# 2x10 ea  10# 2x10 ea   Multifidus Press Walkouts 14# with half press hold 3 laps ea    Walkouts 15# 3 laps ea  Walkouts 15# 3 laps ea    Seated Rows On SB with perturbations    SL 40# 3x 5 ea leg with perturbations on 5th hold      Kneeling Rebounder        Fire Hydrants    Green 2x10 ea     Donkey Kicks    Green 2x10 ea     [place] stomps         [employer]    with side toss with blue med ball 15x ea    [place] Hamstrings         Star Taps         BOSU   Lunge 2x10 with 25#      BOSU Hole in one  2x3   3x3 x6   Kneeling rebounder tosses  Blue 2x10    Blue 2x10                Ther Act                             Modalities         CP PRN

## 2022-05-02 ENCOUNTER — OFFICE VISIT (OUTPATIENT)
Dept: PHYSICAL THERAPY | Facility: CLINIC | Age: 22
End: 2022-05-02
Payer: COMMERCIAL

## 2022-05-02 DIAGNOSIS — S76.012D HIP STRAIN, LEFT, SUBSEQUENT ENCOUNTER: Primary | ICD-10-CM

## 2022-05-02 DIAGNOSIS — S76.012A HIP STRAIN, LEFT, INITIAL ENCOUNTER: ICD-10-CM

## 2022-05-02 PROCEDURE — 97112 NEUROMUSCULAR REEDUCATION: CPT | Performed by: PHYSICAL THERAPIST

## 2022-05-02 PROCEDURE — 97140 MANUAL THERAPY 1/> REGIONS: CPT | Performed by: PHYSICAL THERAPIST

## 2022-05-02 NOTE — PROGRESS NOTES
PT Re-Evaluation     Today's date: 2022  Patient name: Zaira Barber  : 2000  MRN: [id number]  Referring provider: Monica Ritchie MD  Dx:   Encounter Diagnosis     ICD-10-CM    1  Hip strain, left, subsequent encounter  S76 012D    2  Hip strain, left, initial encounter  S76 012A                   Assessment  Assessment details: Patient demonstrates improved ROM, improved strength, and improved functional ability since beginning PT treatment  Patient continues to demonstrate strength deficits and functional deficits including difficulty transferring out of a car and inability in performing jogging and running activities  Patient would benefit from continued skilled PT treatment to address remaining deficits and to facilitate return to prior level of function  Goals  Impairment Goals  - Decrease pain by 50% in 4 weeks - met  - Increase left lower extremity strength golbally to 4+/5 in 4 weeks - partially met    Functional Goals  - Return to Prior Level of Function in 4 weeks - partially met  - Patient will be independent with HEP in 4 weeks - met  - Patient will be able to get in/out of his car with decreased pain in 4 weeks - partially met  - Patient will be able to bend and  a box with decreased pain in 4 weeks - met  - Patient will be able to run and pivot with decreased pain in 4 weeks - not met    Plan  Planned therapy interventions: manual therapy, neuromuscular re-education, patient education, strengthening, stretching, therapeutic activities, therapeutic exercise and home exercise program  Frequency: 2x week  Duration in visits: 12  Duration in weeks: 6  Plan of Care beginning date: 3/31/2022  Plan of Care expiration date: 2022  Treatment plan discussed with: patient        Subjective Evaluation    History of Present Illness  Mechanism of injury: Patient states he feels approximately 60% improved since beginning PT treatment    He notes discomfort with standing on one leg to put on pants, and occasionally getting in and out of the car  He notes that he has been able to start to jog, but still has discomfort after a couple seconds  He notes that he was playing basketball the other day, and had increased pain with this  Pain  Current pain ratin  At best pain ratin  At worst pain ratin          Objective     General Comments:      Hip Comments   Active Range of Motion   Left Hip   [facility] [place]  [facility] [place]  Internal rotation (90/90):  [facility] [place]    Additional Active Range of Motion Details  Lumbar Flexion:  WNL  Lumbar Extension: WNL  Side bend:  WNL bilaterally  Rotation:  WNL bilaterally      Strength/Myotome Testing      Lumbar      Right   Normal strength     Left Hip   Planes of Motion   Flexion: 4  Extension: 4  Abduction: 4     Left Knee   Extension: 4  Flexion: 4+    Left Ankle/Foot   Dorsiflexion: 5  Great toe extension: 5         Diagnosis:    Precautions:    Manuals    PROM        Mobs        IASTM        Re-eval  RT               There Ex        Bike 5 mins 5 mins   5 min  5 min   Prone Quad / Hip Flexor Stretch 20" x 4 w/ bolster  20" x 4 w/ bolster   20" x 4 w/ bolster  20" x 4 w/ bolster   Prone On Elbows / Prone Press Ups        It Band Stretch        Leg Press     Held     Hamstring Stretch        SLR flex                          Neruo Re-Ed        Bridge Triple Threats 20x ea    Hip Thrust @ bench 40# x15  Triple Threats 20x ea  Triple Threats 20x ea  Triple Threats 20x ea    Hip Thrust @ bench 20# x10   SL Hip ABD        Clamshell        Lateral Tap Down        SLB         RDL 15# kettlebell 12" box x15    15# kettlebell 12" box x10   Side steps/ X-walks  + mini squats, black 2 laps + mini squats, black 2 laps  + mini squats, blue  2 laps  + mini squats, black 2 laps   Excursions  10x ea lateral        Standing Chops  Half kneeling on foam: 12# 15x ea  Half kneeling on foam: 12# 15x ea   10# 2x10 ea  10# 2x10 ea Multifidus Press Walkouts 14# with half press hold 3 laps ea    Walkouts 15# 3 laps ea  Walkouts 15# 3 laps ea    Seated Rows On SB with perturbations         Kneeling Rebounder        Fire Hydrants         Donkey Kicks         Myton stomps         BOSU Squat    with side toss with blue med ball 15x ea    Russian Hamstrings         Star Taps        BOSU        BOSU Hole in one  2x3   3x3 x6   Kneeling rebounder tosses  Blue 2x10    Blue 2x10               Ther Act                          Modalities         CP PRN

## 2022-05-05 ENCOUNTER — OFFICE VISIT (OUTPATIENT)
Dept: PHYSICAL THERAPY | Facility: CLINIC | Age: 22
End: 2022-05-05
Payer: COMMERCIAL

## 2022-05-05 DIAGNOSIS — S76.012D HIP STRAIN, LEFT, SUBSEQUENT ENCOUNTER: Primary | ICD-10-CM

## 2022-05-05 DIAGNOSIS — S76.012A HIP STRAIN, LEFT, INITIAL ENCOUNTER: ICD-10-CM

## 2022-05-05 PROCEDURE — 97112 NEUROMUSCULAR REEDUCATION: CPT

## 2022-05-05 PROCEDURE — 97110 THERAPEUTIC EXERCISES: CPT

## 2022-05-05 PROCEDURE — 97530 THERAPEUTIC ACTIVITIES: CPT

## 2022-05-05 NOTE — PROGRESS NOTES
Daily Note     Today's date: 2022  Patient name: Kvng Tinajero  : 2000  MRN: [id number]  Referring provider: Danny Peterson MD  Dx:   Encounter Diagnosis     ICD-10-CM    1  Hip strain, left, subsequent encounter  S76 012D    2  Hip strain, left, initial encounter  S76 012A                   Subjective: Patient reports, "I squatted yesterday and I did 185  It's feeling better, but I still have a little bit of stiffness and pain"  Objective: See treatment diary below      Assessment: Tolerated treatment well  Patient would benefit from continued PT  Patient has been able to report overall improvement with PT, but still notes discomfort along the hip  Challenged patient with higher level tasks and balance exercises  He had some discomfort with side shuffles with pauses, but was challenged with higher level balance tasks  Will continue to progress patient as able  Plan: Continue per plan of care          Diagnosis:    Precautions:    Manuals    PROM        Mobs        IASTM        Re-eval  RT               There Ex        Bike 5 mins 5 mins  5 mins   5 min   Prone Quad / Hip Flexor Stretch 20" x 4 w/ bolster  20" x 4 w/ bolster  20" x 4 w/ bolster   20" x 4 w/ bolster   Prone On Elbows / Prone Press Ups        It Band Stretch        Leg Press         Hamstring Stretch        SLR flex                          Neruo Re-Ed        Bridge Triple Threats 20x ea    Hip Thrust @ bench 40# x15  Triple Threats 20x ea   Triple Threats 20x ea    Hip Thrust @ bench 20# x10   SLB    With black disc plyotoss 20x      RDL 15# kettlebell 12" box x15    15# kettlebell 12" box x10   Side steps/ X-walks  + mini squats, black 2 laps + mini squats, black 2 laps   + mini squats, black 2 laps   Excursions  10x ea lateral        Standing Chops  Half kneeling on foam: 12# 15x ea  Half kneeling on foam: 12# 15x ea    10# 2x10 ea   Multifidus Press Walkouts 14# with half press hold 3 laps ea Walkouts 15# 3 laps ea    Star Taps     5x      SL Foam Kettlebell pass around    Red 10x ea (x 2)      Tandem walking on foam   With kettlebell pass back 2 laps      BOSU Squat   + 20# dumbbell 2x10     SL Squat + UE: 10x      BOSU Hole in one  2x3    x6   Kneeling rebounder tosses  Blue 2x10                Return to Sport    Side shuffle with pause, jog with pause,      Agility Ladder    2 feet in, 2 feet lateral, icky shuffle               Ther Act                          Modalities         CP PRN

## 2022-05-09 ENCOUNTER — APPOINTMENT (OUTPATIENT)
Dept: PHYSICAL THERAPY | Facility: CLINIC | Age: 22
End: 2022-05-09
Payer: COMMERCIAL

## 2022-05-10 ENCOUNTER — OFFICE VISIT (OUTPATIENT)
Dept: PHYSICAL THERAPY | Facility: CLINIC | Age: 22
End: 2022-05-10
Payer: COMMERCIAL

## 2022-05-10 DIAGNOSIS — S76.012D HIP STRAIN, LEFT, SUBSEQUENT ENCOUNTER: Primary | ICD-10-CM

## 2022-05-10 DIAGNOSIS — S76.012A HIP STRAIN, LEFT, INITIAL ENCOUNTER: ICD-10-CM

## 2022-05-10 PROCEDURE — 97110 THERAPEUTIC EXERCISES: CPT

## 2022-05-10 PROCEDURE — 97112 NEUROMUSCULAR REEDUCATION: CPT

## 2022-05-10 NOTE — PROGRESS NOTES
Daily Note     Today's date: 5/10/2022  Patient name: Kvng Tinajero  : 2000  MRN: [id number]  Referring provider: Danny Peterson MD  Dx:   Encounter Diagnosis     ICD-10-CM    1  Hip strain, left, subsequent encounter  S76 012D    2  Hip strain, left, initial encounter  S76 012A                   Subjective: Patient reports, "It's been feeling good"  Objective: See treatment diary below      Assessment: Tolerated treatment well  Patient would benefit from continued PT  Patient was able to perform all given exercises this session  Focused on strengthening, as patient notes that he is limited with strength  Patient reported increased weakness and discomfort with SL BOSU squats and drop jumps  Able to report relief with posterior chain strengthening  Continue to challenge patient as able  Plan: Continue per plan of care          Diagnosis:    Precautions:    Manuals 4/28  5/2  5/5 5/10     PROM        Mobs        IASTM        Re-eval  RT               There Ex        Bike 5 mins 5 mins  5 mins  5 min     Prone Quad / Hip Flexor Stretch 20" x 4 w/ bolster  20" x 4 w/ bolster  20" x 4 w/ bolster  20" x 4 w/ bolster     Prone On Elbows / Prone Press Ups        It Band Stretch        Leg Press         Hamstring Stretch        SLR flex                          Neruo Re-Ed        Bridge Triple Threats 20x ea    Hip Thrust @ bench 40# x15  Triple Threats 20x ea  Triple Threats 20x ea    Donkey Kicks     Green 20x ea     SLB    With black disc plyotoss 20x  With black disc plyotoss 20x     RDL 15# kettlebell 12" box x15       Side steps/ X-walks  + mini squats, black 2 laps + mini squats, black 2 laps      Excursions  10x ea lateral        Standing Chops  Half kneeling on foam: 12# 15x ea  Half kneeling on foam: 12# 15x ea       Multifidus Press Walkouts 14# with half press hold 3 laps ea        Star Taps     5x  5x with pink med ball hold     SL Foam Kettlebell pass around    Red 10x ea (x 2) Tandem walking on foam   With kettlebell pass back 2 laps      Stool Scoots     3 laps     BOSU Squat   + 20# dumbbell 2x10     SL Squat + UE: 10x  + 20# dumbbell 2x10   SL Squat + UE: 10x     BOSU Hole in one  2x3       Kneeling rebounder tosses  Blue 2x10        Drop jumps     12 in 10x       Return to Sport    Side shuffle with pause, jog with pause,      Agility Ladder    2 feet in, 2 feet lateral, icky shuffle               Ther Act                          Modalities         CP PRN

## 2022-05-12 ENCOUNTER — OFFICE VISIT (OUTPATIENT)
Dept: PHYSICAL THERAPY | Facility: CLINIC | Age: 22
End: 2022-05-12
Payer: COMMERCIAL

## 2022-05-12 DIAGNOSIS — S76.012D HIP STRAIN, LEFT, SUBSEQUENT ENCOUNTER: Primary | ICD-10-CM

## 2022-05-12 DIAGNOSIS — S76.012A HIP STRAIN, LEFT, INITIAL ENCOUNTER: ICD-10-CM

## 2022-05-12 PROCEDURE — 97112 NEUROMUSCULAR REEDUCATION: CPT

## 2022-05-12 PROCEDURE — 97110 THERAPEUTIC EXERCISES: CPT

## 2022-05-12 NOTE — PROGRESS NOTES
Daily Note     Today's date: 2022  Patient name: Minesh Edward  : 2000  MRN: [id number]  Referring provider: Laura Ramos MD  Dx:   Encounter Diagnosis     ICD-10-CM    1  Hip strain, left, subsequent encounter  S76 012D    2  Hip strain, left, initial encounter  S76 012A                   Subjective: Patient reports, 'It's feeling better"  Objective: See treatment diary below      Assessment: Tolerated treatment well  Patient would benefit from continued PT  Patient was better able to tolerate light jogging and higher level tasks this session  He was able to perform drop jumps and return to sport tasks without complaints of weakness or pain  Does note muscular fatigue by the end of the session  Will continue to progress patient at future sessions  Plan: Continue per plan of care          Diagnosis:    Precautions:    Manuals  5/2  5/5 5/10  5/12   PROM        Mobs        IASTM        Re-eval  RT               There Ex        Bike  5 mins  5 mins  5 min  5 min    Prone Quad / Hip Flexor Stretch  20" x 4 w/ bolster  20" x 4 w/ bolster  20" x 4 w/ bolster  20" x 4 w/ bolster    Prone On Elbows / Prone Press Ups        It Band Stretch        Leg Press         Hamstring Stretch        SLR flex                          Neruo Re-Ed        Bridge  Triple Threats 20x ea  Triple Threats 20x ea Triple Threats 20x ea   Donkey Kicks     Green 20x ea     SLB    With black disc plyotoss 20x  With black disc plyotoss 20x     RDL     With cup tap: 5x on 8 in block    Side steps/ X-walks   + mini squats, black 2 laps   + mini squats, black 2 laps   Excursions         Standing Chops   Half kneeling on foam: 12# 15x ea       Multifidus Press        Star Taps     5x  5x with pink med ball hold     SL Foam Kettlebell pass around    Red 10x ea (x 2)      Tandem walking on foam   With kettlebell pass back 2 laps      Stool Scoots     3 laps     BOSU    Squat + 20# dumbbell 2x10     SL Squat + UE: 10x squat + 20# dumbbell 2x10   SL Squat + UE: 10x  SL Squat  Blue up: 2x10     BOSU Lateral Lunge + 20# 2x10    BOSU Hole in one         Kneeling rebounder tosses         Drop jumps     12 in 10x    8 in, 10x    Return to Sport    Side shuffle with pause, jog with pause,   Side shuffle, carioca, jog x2, high knees    Agility Ladder    2 feet in, 2 feet lateral, icky shuffle               Ther Act                          Modalities         CP PRN

## 2022-05-16 ENCOUNTER — APPOINTMENT (OUTPATIENT)
Dept: PHYSICAL THERAPY | Facility: CLINIC | Age: 22
End: 2022-05-16
Payer: COMMERCIAL

## 2022-05-17 ENCOUNTER — OFFICE VISIT (OUTPATIENT)
Dept: PHYSICAL THERAPY | Facility: CLINIC | Age: 22
End: 2022-05-17
Payer: COMMERCIAL

## 2022-05-17 DIAGNOSIS — S76.012A HIP STRAIN, LEFT, INITIAL ENCOUNTER: ICD-10-CM

## 2022-05-17 DIAGNOSIS — S76.012D HIP STRAIN, LEFT, SUBSEQUENT ENCOUNTER: Primary | ICD-10-CM

## 2022-05-17 PROCEDURE — 97112 NEUROMUSCULAR REEDUCATION: CPT

## 2022-05-17 PROCEDURE — 97110 THERAPEUTIC EXERCISES: CPT

## 2022-05-17 PROCEDURE — 97140 MANUAL THERAPY 1/> REGIONS: CPT

## 2022-05-17 NOTE — PROGRESS NOTES
Daily Note     Today's date: 2022  Patient name: Mayte Sadler  : 2000  MRN: [id number]  Referring provider: Rocío Busch MD  Dx:   Encounter Diagnosis     ICD-10-CM    1  Hip strain, left, subsequent encounter  S76 012D    2  Hip strain, left, initial encounter  S76 012A                   Subjective: Patient reports, "I was able to play  basketball on Friday and it was fine  I am still having trouble with landing after jumping"  '        Objective: See treatment diary below      Assessment: Tolerated treatment well  Patient would benefit from continued PT  Patient did really well with his program today  Able to progress to higher level tasks without complaints of pain, instability or weakness  He does note overall weakness and coordination in the left LE in comparison to the right  Focused on soft landing with jumping tasks  Overall fatigue achieved s/p session  Continue to progress patient as able  Plan: Continue per plan of care          Diagnosis:    Precautions:    Manuals 5/17   5/5 5/10  5/12   PROM        Mobs        IASTM        Re-eval                 There Ex        Bike   5 mins  5 min  5 min    Prone Quad / Hip Flexor Stretch   20" x 4 w/ bolster  20" x 4 w/ bolster  20" x 4 w/ bolster    Prone On Elbows / Prone Press Ups        It Band Stretch        Leg Press  170# DL 2x10   70# SL: 2x10        Hamstring Stretch        SLR flex                          Neruo Re-Ed        Bridge Triple Threats 20x ea   Triple Threats 20x ea Triple Threats 20x ea   Donkey Kicks     Green 20x ea     SLB    With black disc plyotoss 20x  With black disc plyotoss 20x     RDL     With cup tap: 5x on 8 in block    Side steps/ X-walks      + mini squats, black 2 laps   Excursions         Standing Chops         Multifidus Press        Star Taps     5x  5x with pink med ball hold     SL Foam Kettlebell pass around    Red 10x ea (x 2)      Tandem walking on foam   With kettlebell pass back 2 laps Stool Scoots     3 laps     BOSU    Squat + 20# dumbbell 2x10     SL Squat + UE: 10x   squat + 20# dumbbell 2x10   SL Squat + UE: 10x  SL Squat  Blue up: 2x10     BOSU Lateral Lunge + 20# 2x10    BOSU Hole in one         Therapeutic Activity         Drop jumps  8 in, 10x   DL jump down: 10x    12 in 10x    8 in, 10x    Return to Sport  Side shuffle, carioca, jog x2, high knees, change of direction, broad jumps, SL jumps, soccer ball kicks   Side shuffle with pause, jog with pause,   Side shuffle, carioca, jog x2, high knees    Agility Ladder  2 feet in, 2 feet lateral, icky shuffle  2 feet in, 2 feet lateral, icky shuffle               Ther Act                          Modalities         CP PRN

## 2022-05-19 ENCOUNTER — OFFICE VISIT (OUTPATIENT)
Dept: PHYSICAL THERAPY | Facility: CLINIC | Age: 22
End: 2022-05-19
Payer: COMMERCIAL

## 2022-05-19 DIAGNOSIS — S76.012A HIP STRAIN, LEFT, INITIAL ENCOUNTER: ICD-10-CM

## 2022-05-19 DIAGNOSIS — S76.012D HIP STRAIN, LEFT, SUBSEQUENT ENCOUNTER: Primary | ICD-10-CM

## 2022-05-19 PROCEDURE — 97110 THERAPEUTIC EXERCISES: CPT

## 2022-05-19 PROCEDURE — 97530 THERAPEUTIC ACTIVITIES: CPT

## 2022-05-19 PROCEDURE — 97112 NEUROMUSCULAR REEDUCATION: CPT

## 2022-05-19 NOTE — PROGRESS NOTES
Daily Note     Today's date: 2022  Patient name: Greta Dawn  : 2000  MRN: [id number]  Referring provider: Jennifer Beltran MD  Dx:   Encounter Diagnosis     ICD-10-CM    1  Hip strain, left, subsequent encounter  S76 012D    2  Hip strain, left, initial encounter  S76 012A                   Subjective: Patient reports, "I feel about 99% better"  Objective: See treatment diary below      Assessment: Tolerated treatment well  Patient would benefit from continued PT  Patient has been able to report overall improvement since starting PT  Worked on jumping, explosion, landing, agility, and control during today's session  Patient was most challenged with single leg hopping, as he lacks explosion, and fatigues quicker than the right  He has been able to play basketball with good tolerance  Will continue to progress as able  Patient will potentially discharge next week  Plan: Continue per plan of care          Diagnosis:    Precautions:    Manuals 5/17  5/19   5/10  5/12   PROM        Mobs        IASTM        Re-eval                 There Ex        Bike  5 min   5 min  5 min    Prone Quad / Hip Flexor Stretch    20" x 4 w/ bolster  20" x 4 w/ bolster    Prone On Elbows / Prone Press Ups        It Band Stretch        Leg Press  170# DL 2x10   70# SL: 2x10        Hamstring Stretch        SLR flex                          Neruo Re-Ed        Bridge Triple Threats 20x ea   Triple Threats 20x ea Triple Threats 20x ea   Donkey Kicks     Green 20x ea     SLB     With black disc plyotoss 20x     RDL     With cup tap: 5x on 8 in block    Side steps/ X-walks      + mini squats, black 2 laps   Excursions         Standing Chops         Multifidus Press        Star Taps      5x with pink med ball hold     Lateral Hop with [place] strap  15# 10x ea side                Stool Scoots     3 laps     BOSU      squat + 20# dumbbell 2x10   SL Squat + UE: 10x  SL Squat  Blue up: 2x10     BOSU Lateral Lunge + 20# 2x10 [place] in one         Therapeutic Activity         Drop jumps  8 in, 10x   DL jump down: 10x  12 in 10x   Jump ups: 12 in, 10x   12 in 10x    8 in, 10x    Return to Sport  Side shuffle, carioca, jog x2, high knees, change of direction, broad jumps, SL jumps, soccer ball kicks  Slalom jumps: 15 sec x 2   Christiano jumps: forward, lateral, single leg x 1ea, hop testing (SL, crossover), lateral skater hops      Side shuffle, carioca, jog x2, high knees    Agility Ladder  2 feet in, 2 feet lateral, icky shuffle 2 feet in, 2 feet lateral, icky shuffle                Ther Act                          Modalities         CP PRN

## 2022-05-23 ENCOUNTER — APPOINTMENT (OUTPATIENT)
Dept: PHYSICAL THERAPY | Facility: CLINIC | Age: 22
End: 2022-05-23
Payer: COMMERCIAL

## 2022-05-24 ENCOUNTER — EVALUATION (OUTPATIENT)
Dept: PHYSICAL THERAPY | Facility: CLINIC | Age: 22
End: 2022-05-24
Payer: COMMERCIAL

## 2022-05-24 DIAGNOSIS — S76.012A HIP STRAIN, LEFT, INITIAL ENCOUNTER: ICD-10-CM

## 2022-05-24 DIAGNOSIS — S76.012D HIP STRAIN, LEFT, SUBSEQUENT ENCOUNTER: Primary | ICD-10-CM

## 2022-05-24 PROCEDURE — 97110 THERAPEUTIC EXERCISES: CPT | Performed by: PHYSICAL THERAPIST

## 2022-05-24 NOTE — PROGRESS NOTES
PT Discharge    Today's date: 2022  Patient name: Lynn Osuna  : 2000  MRN: [id number]  Referring provider: Daniel Garces MD  Dx:   Encounter Diagnosis     ICD-10-CM    1  Hip strain, left, subsequent encounter  S76 012D    2  Hip strain, left, initial encounter  S76 012A                   Assessment  Assessment details: Lynn Osuna is a 25 y o  male who has been consistent with attending and participating in skilled PT sessions  At this time, the patient has been able to return to his prior baseline function  He has been able to note decreased pain overall as well as increased overall functional performance  He has demonstrated independence with his HEP and will transition to this full time  If eKnneth Cline were to need PT in the future, we would be happy to share in his care  Goals  Impairment Goals  - Decrease pain by 50% in 4 weeks - MET  - Increase left lower extremity strength golbally to 4+/5 in 4 weeks - MET    Functional Goals  - Return to Prior Level of Function in 4 weeks - MET  - Patient will be independent with HEP in 4 weeks - MET  - Patient will be able to get in/out of his car with decreased pain in 4 weeks - MET  - Patient will be able to bend and  a box with decreased pain in 4 weeks - MET  - Patient will be able to run and pivot with decreased pain in 4 weeks - MET    Plan  Planned therapy interventions: manual therapy, neuromuscular re-education, patient education, strengthening, stretching, therapeutic activities, therapeutic exercise and home exercise program  Frequency: 2x week  Duration in visits: 12  Duration in weeks: 6  Plan of Care beginning date: 3/31/2022  Plan of Care expiration date: 2022  Treatment plan discussed with: patient        Subjective Evaluation    History of Present Illness  Mechanism of injury: Patient reports that he has improved "100%" since starting PT    He notes that he can run, jump, and complete all of his activities without any restriction  He does not feel that he is limited with anything at this time  Pain  Current pain ratin  At best pain ratin  At worst pain ratin          Objective     General Comments:      Hip Comments   Active Range of Motion   Left Hip   Flexion: [facility] [place]  External rotation (90/90): [facility] [place]  Internal rotation (90/90):  [facility] [place]    Additional Active Range of Motion Details  Lumbar Flexion:  WNL  Lumbar Extension: WNL  Side bend:  WNL bilaterally  Rotation:  WNL bilaterally      Strength/Myotome Testing      Lumbar      Right   Normal strength     Left Hip   Planes of Motion   Flexion: 5  Extension: 5  Abduction: 5     Left Knee   Extension: 5  Flexion: 5    Left Ankle/Foot   Dorsiflexion: 5  Great toe extension: 5         Diagnosis:    Precautions:    Manuals 5/17  5/19  5/24 5/10  5/12   PROM        Mobs        IASTM        Re-eval                 There Ex        Bike  5 min  5 min 5 min  5 min    Prone Quad / Hip Flexor Stretch    20" x 4 w/ bolster  20" x 4 w/ bolster    Prone On Elbows / Prone Press Ups        It Band Stretch        Leg Press  170# DL 2x10   70# SL: 2x10        Hamstring Stretch        SLR flex                 Patient Education   RT RT      Neruo Re-Ed        Bridge Triple Threats 20x ea   Triple Threats 20x ea Triple Threats 20x ea   Donkey Kicks     Green 20x ea     SLB     With black disc plyotoss 20x     RDL     With cup tap: 5x on 8 in block    Side steps/ X-walks      + mini squats, black 2 laps   Excursions         Standing Chops         Multifidus Press        Star Taps      5x with pink med ball hold     Lateral Hop with [place] strap  15# 10x ea side                Stool Scoots     3 laps     BOSU      squat + 20# dumbbell 2x10   SL Squat + UE: 10x  SL Squat  Blue up: 2x10     BOSU Lateral Lunge + 20# 2x10    BOSU Hole in one         Therapeutic Activity         Drop jumps  8 in, 10x   DL jump down: 10x  12 in 10x   Jump ups: 12 in, 10x   12 in 10x    8 in, 10x    Return to Sport  Side shuffle, carioca, jog x2, high knees, change of direction, broad jumps, SL jumps, soccer ball kicks  Slalom jumps: 15 sec x 2   Christiano jumps: forward, lateral, single leg x 1ea, hop testing (SL, crossover), lateral skater hops      Side shuffle, carioca, jog x2, high knees    Agility Ladder  2 feet in, 2 feet lateral, icky shuffle 2 feet in, 2 feet lateral, icky shuffle                Ther Act                          Modalities         CP PRN

## 2022-05-26 ENCOUNTER — APPOINTMENT (OUTPATIENT)
Dept: PHYSICAL THERAPY | Facility: CLINIC | Age: 22
End: 2022-05-26
Payer: COMMERCIAL

## 2023-11-16 ENCOUNTER — OFFICE VISIT (OUTPATIENT)
Dept: URGENT CARE | Facility: CLINIC | Age: 23
End: 2023-11-16
Payer: COMMERCIAL

## 2023-11-16 VITALS
HEART RATE: 72 BPM | DIASTOLIC BLOOD PRESSURE: 66 MMHG | TEMPERATURE: 97.9 F | SYSTOLIC BLOOD PRESSURE: 142 MMHG | OXYGEN SATURATION: 99 % | RESPIRATION RATE: 18 BRPM

## 2023-11-16 DIAGNOSIS — J02.9 SORE THROAT: Primary | ICD-10-CM

## 2023-11-16 LAB — S PYO AG THROAT QL: NEGATIVE

## 2023-11-16 PROCEDURE — 87880 STREP A ASSAY W/OPTIC: CPT | Performed by: NURSE PRACTITIONER

## 2023-11-16 PROCEDURE — 99213 OFFICE O/P EST LOW 20 MIN: CPT | Performed by: NURSE PRACTITIONER

## 2023-11-17 NOTE — PROGRESS NOTES
[place]        NAME: Mark Marie is a 26 y.o. male  : 2000    MRN: [id number]  DATE: 2023  TIME: 8:28 PM    Assessment and Plan   Sore throat [J02.9]  1. Sore throat  POCT rapid strepA        Rapid strep is negative. Advised supportive care including salt water garlge,s tylenol, motrin, and OTC decongestant for symptoms. Follow up with PCP as needed. Patient Instructions       Follow up with PCP in 3-5 days. Proceed to  ER if symptoms worsen. Chief Complaint     Chief Complaint   Patient presents with    Sore Throat     X 2 days sore throat, congestion          History of Present Illness       Patient is a 26year old male presenting with 3 days of sore throat, congestion, PND, and cough. Denies ear pain, fever, or chills. He is using cough drops. Sore Throat   Associated symptoms include congestion and coughing. Pertinent negatives include no abdominal pain, diarrhea, ear pain, headaches, shortness of breath or vomiting. Review of Systems   Review of Systems   Constitutional:  Negative for activity change, chills, fatigue and fever. HENT:  Positive for congestion, postnasal drip and sore throat. Negative for ear pain, rhinorrhea and sinus pain. Respiratory:  Positive for cough. Negative for shortness of breath. Gastrointestinal:  Negative for abdominal pain, diarrhea, nausea and vomiting. Musculoskeletal:  Negative for myalgias. Skin:  Negative for rash. Neurological:  Negative for headaches.          Current Medications       Current Outpatient Medications:     clobetasol (TEMOVATE) 0.05 % cream, , Disp: , Rfl:     naproxen (Naprosyn) 500 mg tablet, Take 1 tablet (500 mg total) by mouth 2 (two) times a day with meals (Patient not taking: Reported on 2023), Disp: 28 tablet, Rfl: 0    Current Allergies     Allergies as of 2023    (No Known Allergies)            The following portions of the patient's history were reviewed and updated as appropriate: allergies, current medications, past family history, past medical history, past social history, past surgical history and problem list.     History reviewed. No pertinent past medical history. Past Surgical History:   Procedure Laterality Date    NO PAST SURGERIES         Family History   Problem Relation Age of Onset    Breast cancer Family     Colon cancer Family     Pancreatic cancer Family          Medications have been verified. Objective   /66   Pulse 72   Temp 97.9 °F (36.6 °C)   Resp 18   SpO2 99%        Physical Exam     Physical Exam  Vitals reviewed. Constitutional:       General: He is awake. He is not in acute distress. Appearance: Normal appearance. HENT:      Head: Normocephalic. Right Ear: Hearing, tympanic membrane, ear canal and external ear normal.      Left Ear: Hearing, tympanic membrane, ear canal and external ear normal.      Nose: Congestion and rhinorrhea present. Mouth/Throat:      Lips: Pink. Pharynx: Posterior oropharyngeal erythema present. Cardiovascular:      Rate and Rhythm: Normal rate and regular rhythm. Heart sounds: Normal heart sounds, S1 normal and S2 normal.   Pulmonary:      Effort: Pulmonary effort is normal.      Breath sounds: Normal breath sounds. No decreased breath sounds, wheezing, rhonchi or rales. Skin:     General: Skin is warm and moist.   Neurological:      General: No focal deficit present. Mental Status: He is alert, oriented to person, place, and time and easily aroused. Psychiatric:         Behavior: Behavior is cooperative.

## 2025-04-26 ENCOUNTER — OFFICE VISIT (OUTPATIENT)
Dept: URGENT CARE | Facility: CLINIC | Age: 25
End: 2025-04-26
Payer: COMMERCIAL

## 2025-04-26 VITALS
OXYGEN SATURATION: 97 % | RESPIRATION RATE: 15 BRPM | TEMPERATURE: 97.5 F | SYSTOLIC BLOOD PRESSURE: 134 MMHG | DIASTOLIC BLOOD PRESSURE: 59 MMHG | HEART RATE: 64 BPM

## 2025-04-26 DIAGNOSIS — J45.901 REACTIVE AIRWAY DISEASE WITH ACUTE EXACERBATION, UNSPECIFIED ASTHMA SEVERITY, UNSPECIFIED WHETHER PERSISTENT: Primary | ICD-10-CM

## 2025-04-26 PROCEDURE — S9083 URGENT CARE CENTER GLOBAL: HCPCS | Performed by: FAMILY MEDICINE

## 2025-04-26 PROCEDURE — G0382 LEV 3 HOSP TYPE B ED VISIT: HCPCS | Performed by: FAMILY MEDICINE

## 2025-04-26 RX ORDER — PREDNISONE 20 MG/1
20 TABLET ORAL DAILY
Qty: 5 TABLET | Refills: 0 | Status: SHIPPED | OUTPATIENT
Start: 2025-04-26 | End: 2025-05-01

## 2025-04-26 RX ORDER — IPRATROPIUM BROMIDE AND ALBUTEROL SULFATE 2.5; .5 MG/3ML; MG/3ML
3 SOLUTION RESPIRATORY (INHALATION)
Status: DISCONTINUED | OUTPATIENT
Start: 2025-04-26 | End: 2025-04-26

## 2025-04-26 RX ORDER — IPRATROPIUM BROMIDE AND ALBUTEROL SULFATE 2.5; .5 MG/3ML; MG/3ML
3 SOLUTION RESPIRATORY (INHALATION) ONCE
Status: COMPLETED | OUTPATIENT
Start: 2025-04-26 | End: 2025-04-26

## 2025-04-26 RX ADMIN — IPRATROPIUM BROMIDE AND ALBUTEROL SULFATE 3 ML: 2.5; .5 SOLUTION RESPIRATORY (INHALATION) at 12:54

## 2025-04-26 NOTE — PROGRESS NOTES
[facility]        NAME: Blayne Monroy is a 25 y.o. male  : 2000    MRN: [id number]  DATE: 2025  TIME: 12:43 PM    Assessment and Plan   Reactive airway disease with acute exacerbation, unspecified asthma severity, unspecified whether persistent [J45.901]  1. Reactive airway disease with acute exacerbation, unspecified asthma severity, unspecified whether persistent  ipratropium-albuterol (DUO-NEB) 0.5-2.5 mg/3 mL inhalation solution 3 mL    predniSONE 20 mg tablet        Likely has undiagnosed asthma and will benefit from a pulmonology evaluation.  In the meantime, was given a DuoNeb treatment with significant improvement.  Will continue treatment with 5 more days of steroid.    Patient Instructions     Follow up with PCP in 3-5 days.  Proceed to  ER if symptoms worsen.    If tests have been performed at [facility], our office will contact you with results if changes need to be made to the care plan discussed with you at the visit.  You can review your full results on [facility].    Chief Complaint     Chief Complaint   Patient presents with    Cold Like Symptoms     Pt reports he having wheezing and tightness in his chest- he feels like he is having periods of SOB and reports at times its hard to breath- he also reports having a cough for about 4 days         History of Present Illness       25-year-old male presents today with about 4 days of coughing associated with chest tightness, dyspnea, chest pain, headaches, diaphoresis and nasal symptoms.  Does have seasonal allergies for which he takes Zyrtec.  Suspects having a mild asthma undiagnosed.  He has experienced chest tightness in the past but not to this degree.  Denies any obvious fevers, chills, dizziness or abdominal symptoms.  Denies any obvious sick contacts.      Review of Systems   Review of Systems   Constitutional:  Positive for diaphoresis. Negative for chills and fever.   HENT:  Positive for congestion and  rhinorrhea.    Respiratory:  Positive for cough, chest tightness and shortness of breath.    Cardiovascular:  Positive for chest pain.   Gastrointestinal:  Negative for abdominal pain and nausea.   Allergic/Immunologic: Positive for environmental allergies.   Neurological:  Positive for headaches. Negative for dizziness.     Current Medications       Current Outpatient Medications:     predniSONE 20 mg tablet, Take 1 tablet (20 mg total) by mouth daily for 5 days, Disp: 5 tablet, Rfl: 0    clobetasol (TEMOVATE) 0.05 % cream, , Disp: , Rfl:     naproxen (Naprosyn) 500 mg tablet, Take 1 tablet (500 mg total) by mouth 2 (two) times a day with meals (Patient not taking: Reported on 4/26/2025), Disp: 28 tablet, Rfl: 0    Current Facility-Administered Medications:     ipratropium-albuterol (DUO-NEB) 0.5-2.5 mg/3 mL inhalation solution 3 mL, 3 mL, Nebulization, Q6H,     Current Allergies     Allergies as of 04/26/2025    (No Known Allergies)            The following portions of the patient's history were reviewed and updated as appropriate: allergies, current medications, past family history, past medical history, past social history, past surgical history and problem list.     History reviewed. No pertinent past medical history.    Past Surgical History:   Procedure Laterality Date    NO PAST SURGERIES         Family History   Problem Relation Age of Onset    Breast cancer Family     Colon cancer Family     Pancreatic cancer Family          Medications have been verified.        Objective   /59   Pulse 64   Temp 97.5 °F (36.4 °C)   Resp 15   SpO2 97%   No LMP for male patient.       Physical Exam     Physical Exam  Vitals and nursing note reviewed.   Constitutional:       General: He is in acute distress.      Appearance: Normal appearance. He is not ill-appearing or toxic-appearing.   HENT:      Head: Normocephalic and atraumatic.      Nose: Congestion and rhinorrhea present.      Comments: Inflamed nasal  mucosa     Mouth/Throat:      Mouth: Mucous membranes are moist.      Pharynx: No posterior oropharyngeal erythema.   Eyes:      General:         Right eye: No discharge.         Left eye: No discharge.      Conjunctiva/sclera: Conjunctivae normal.   Cardiovascular:      Rate and Rhythm: Normal rate and regular rhythm.   Pulmonary:      Effort: Pulmonary effort is normal. No respiratory distress.      Breath sounds: Normal breath sounds. No wheezing, rhonchi or rales.   Skin:     General: Skin is warm.      Findings: No erythema.   Neurological:      General: No focal deficit present.      Mental Status: He is alert and oriented to person, place, and time.   Psychiatric:         Mood and Affect: Mood normal.         Behavior: Behavior normal.         Thought Content: Thought content normal.         Judgment: Judgment normal.

## 2025-07-01 ENCOUNTER — OFFICE VISIT (OUTPATIENT)
Dept: FAMILY MEDICINE CLINIC | Facility: CLINIC | Age: 25
End: 2025-07-01
Payer: COMMERCIAL

## 2025-07-01 VITALS
WEIGHT: 230 LBS | TEMPERATURE: 97.2 F | OXYGEN SATURATION: 98 % | HEART RATE: 74 BPM | HEIGHT: 69 IN | DIASTOLIC BLOOD PRESSURE: 88 MMHG | SYSTOLIC BLOOD PRESSURE: 112 MMHG | BODY MASS INDEX: 34.07 KG/M2

## 2025-07-01 DIAGNOSIS — R06.02 SHORTNESS OF BREATH: ICD-10-CM

## 2025-07-01 DIAGNOSIS — Z00.00 ANNUAL PHYSICAL EXAM: Primary | ICD-10-CM

## 2025-07-01 DIAGNOSIS — Z23 ENCOUNTER FOR IMMUNIZATION: ICD-10-CM

## 2025-07-01 DIAGNOSIS — R06.2 WHEEZING: ICD-10-CM

## 2025-07-01 PROCEDURE — 90471 IMMUNIZATION ADMIN: CPT

## 2025-07-01 PROCEDURE — 90715 TDAP VACCINE 7 YRS/> IM: CPT

## 2025-07-01 PROCEDURE — 99395 PREV VISIT EST AGE 18-39: CPT | Performed by: NURSE PRACTITIONER

## 2025-07-01 NOTE — PROGRESS NOTES
"Name: Blayne Monroy      : 2000      MRN: [id number]  Encounter Provider: JASON Lima  Encounter Date: 2025   Encounter department: [facility]    Assessment & Plan         History of Present Illness     HPI  Review of Systems  Past Medical History[1]  Past Surgical History[2]  Family History[3]  Social History[4]  Medications[5]  No Known Allergies  Immunization History   Administered Date(s) Administered    DTaP 5 2001, 2001, 2002, 2002, 10/02/2006    HPV9 10/23/2020    Hep A, adult 10/23/2020    Hep A, ped/adol, 2 dose 2017    Hep B, adult 2001, 2001, 2002    Hib (PRP-OMP) 2001, 2001, 2002, 2002    IPV 2001, 2002, 2002, 10/02/2006    Influenza Quadrivalent Preservative Free 3 years and older IM 2017    Influenza, injectable, quadrivalent, preservative free 0.5 mL 10/23/2020    Influenza, seasonal, injectable 2012    MMR 2002, 10/02/2006    Meningococcal Conjugate (MCV4O) 2012    Meningococcal, Unknown Serogroups 2017    Pneumococcal Polysaccharide PPV23 2001, 2002, 2002, 2002    Tdap 2012    Varicella 2002, 02/15/2009     Objective   /88 (BP Location: Right arm, Patient Position: Sitting, Cuff Size: Large)   Pulse 74   Temp (!) 97.2 °F (36.2 °C) (Temporal)   Ht 5' 8.5\" (1.74 m)   Wt 104 kg (230 lb)   SpO2 98%   BMI 34.46 kg/m²     Physical Exam         [1] No past medical history on file.  [2]   Past Surgical History:  Procedure Laterality Date    NO PAST SURGERIES     [3]   Family History  Problem Relation Name Age of Onset    Hypertension Mother      Breast cancer Family      Colon cancer Family      Pancreatic cancer Family     [4]   Social History  Tobacco Use    Smoking status: Never    Smokeless tobacco: Never   Vaping Use    Vaping status: Former   Substance and Sexual Activity    Alcohol use: Yes "     Comment: Occasional    Drug use: No    Sexual activity: Never   [5]   Current Outpatient Medications on File Prior to Visit   Medication Sig    clobetasol (TEMOVATE) 0.05 % cream  (Patient not taking: Reported on 12/7/2021)    naproxen (Naprosyn) 500 mg tablet Take 1 tablet (500 mg total) by mouth 2 (two) times a day with meals (Patient not taking: Reported on 11/16/2023)

## 2025-07-01 NOTE — ASSESSMENT & PLAN NOTE
Orders:    Complete PFT with post bronchodilator; Future    Ambulatory Referral to Pulmonology; Future    XR chest pa and lateral; Future

## 2025-07-01 NOTE — PATIENT INSTRUCTIONS
"Patient Education     Routine physical for adults   The Basics   Written by the doctors and editors at [facility]   What is a physical? -- A physical is a routine visit, or \"check-up,\" with your doctor. You might also hear it called a \"wellness visit\" or \"preventive visit.\"  During each visit, the doctor will:   Ask about your physical and mental health   Ask about your habits, behaviors, and lifestyle   Do an exam   Give you vaccines if needed   Talk to you about any medicines you take   Give advice about your health   Answer your questions  Getting regular check-ups is an important part of taking care of your health. It can help your doctor find and treat any problems you have. But it's also important for preventing health problems.  A routine physical is different from a \"sick visit.\" A sick visit is when you see a doctor because of a health concern or problem. Since physicals are scheduled ahead of time, you can think about what you want to ask the doctor.  How often should I get a physical? -- It depends on your age and health. In general, for people age 21 years and older:   If you are younger than 50 years, you might be able to get a physical every 3 years.   If you are 50 years or older, your doctor might recommend a physical every year.  If you have an ongoing health condition, like diabetes or high blood pressure, your doctor will probably want to see you more often.  What happens during a physical? -- In general, each visit will include:   Physical exam - The doctor or nurse will check your height, weight, heart rate, and blood pressure. They will also look at your eyes and ears. They will ask about how you are feeling and whether you have any symptoms that bother you.   Medicines - It's a good idea to bring a list of all the medicines you take to each doctor visit. Your doctor will talk to you about your medicines and answer any questions. Tell them if you are having any side effects that bother you. You " "should also tell them if you are having trouble paying for any of your medicines.   Habits and behaviors - This includes:   Your diet   Your exercise habits   Whether you smoke, drink alcohol, or use drugs   Whether you are sexually active   Whether you feel safe at home  Your doctor will talk to you about things you can do to improve your health and lower your risk of health problems. They will also offer help and support. For example, if you want to quit smoking, they can give you advice and might prescribe medicines. If you want to improve your diet or get more physical activity, they can help you with this, too.   Lab tests, if needed - The tests you get will depend on your age and situation. For example, your doctor might want to check your:   Cholesterol   Blood sugar   Iron level   Vaccines - The recommended vaccines will depend on your age, health, and what vaccines you already had. Vaccines are very important because they can prevent certain serious or deadly infections.   Discussion of screening - \"Screening\" means checking for diseases or other health problems before they cause symptoms. Your doctor can recommend screening based on your age, risk, and preferences. This might include tests to check for:   Cancer, such as breast, prostate, cervical, ovarian, colorectal, prostate, lung, or skin cancer   Sexually transmitted infections, such as chlamydia and gonorrhea   Mental health conditions like depression and anxiety  Your doctor will talk to you about the different types of screening tests. They can help you decide which screenings to have. They can also explain what the results might mean.   Answering questions - The physical is a good time to ask the doctor or nurse questions about your health. If needed, they can refer you to other doctors or specialists, too.  Adults older than 65 years often need other care, too. As you get older, your doctor will talk to you about:   How to prevent falling at " home   Hearing or vision tests   Memory testing   How to take your medicines safely   Making sure that you have the help and support you need at home  All topics are updated as new evidence becomes available and our peer review process is complete.  This topic retrieved from [employer] on: May 02, 2024.  Topic 591380 Version 1.0  Release: 32.4.3 - C32.122  © 2024 UpToDate, Inc. and/or its affiliates. All rights reserved.  Consumer Information Use and Disclaimer   Disclaimer: This generalized information is a limited summary of diagnosis, treatment, and/or medication information. It is not meant to be comprehensive and should be used as a tool to help the user understand and/or assess potential diagnostic and treatment options. It does NOT include all information about conditions, treatments, medications, side effects, or risks that may apply to a specific patient. It is not intended to be medical advice or a substitute for the medical advice, diagnosis, or treatment of a health care provider based on the health care provider's examination and assessment of a patient's specific and unique circumstances. Patients must speak with a health care provider for complete information about their health, medical questions, and treatment options, including any risks or benefits regarding use of medications. This information does not endorse any treatments or medications as safe, effective, or approved for treating a specific patient. UpToDate, Inc. and its affiliates disclaim any warranty or liability relating to this information or the use thereof.The use of this information is governed by the Terms of Use, available at https://www.woltersSentonsuwer.com/en/know/clinical-effectiveness-terms. 2024© UpToDate, Inc. and its affiliates and/or licensors. All rights reserved.  Copyright   © 2024 UpToDate, Inc. and/or its affiliates. All rights reserved.

## 2025-07-01 NOTE — PROGRESS NOTES
Adult Annual Physical  Name: Blayne Monroy      : 2000      MRN: [id number]  Encounter Provider: JASON Lima  Encounter Date: 2025   Encounter department: [facility]    :  Assessment & Plan  Annual physical exam         Encounter for immunization    Orders:    TDAP VACCINE GREATER THAN OR EQUAL TO 8YO IM    Shortness of breath    Orders:    Complete PFT with post bronchodilator; Future    Ambulatory Referral to Pulmonology; Future    XR chest pa and lateral; Future    Wheezing    Orders:    Complete PFT with post bronchodilator; Future    Ambulatory Referral to Pulmonology; Future    XR chest pa and lateral; Future    Annual physical exam               Preventive Screenings:  - Diabetes Screening: screening not indicated  - Cholesterol Screening: screening not indicated   - Hepatitis C screening: screening up-to-date   - HIV screening: screening not indicated   - Colon cancer screening: screening not indicated   - Lung cancer screening: screening not indicated   - Prostate cancer screening: screening not indicated     Immunizations:    - Risks/benefits immunizations discussed      Counseling/Anticipatory Guidance:    - Dental health: discussed importance of regular tooth brushing, flossing, and dental visits.   - Diet: discussed recommendations for a healthy/well-balanced diet.   - Exercise: the importance of regular exercise/physical activity was discussed. Recommend exercise 3-5 times per week for at least 30 minutes.   - Injury prevention: discussed safety/seat belts, safety helmets, smoke detectors, carbon monoxide detectors, and smoking near bedding or upholstery.       Depression Screening and Follow-up Plan: Patient was screened for depression during today's encounter. They screened negative with a PHQ-2 score of 0.          History of Present Illness     Adult Annual Physical:  Patient presents for annual physical. Here for re establish care  Her for wellness  exam  Had gone to urgent care in march for asthma/reactive airway disease  Better now  Quit smoking  .     Diet and Physical Activity:  - Diet/Nutrition: no special diet.  - Exercise: 3-4 times a week on average and 5-7 times a week on average.    Depression Screening:  - PHQ-2 Score: 0    General Health:  - Sleep: sleeps well.  - Hearing: normal hearing bilateral ears.  - Vision: wears glasses.  - Dental: brushes teeth once daily and no dental visits for > 1 year.    /GYN Health:    - History of STDs: no     Health:  - History of STDs: no.     Advanced Care Planning:  - Has an advanced directive?: no    - Has a durable medical POA?: no    - ACP document given to patient?: no      Review of Systems   Constitutional:  Negative for fatigue and fever.   HENT:  Negative for congestion, postnasal drip and rhinorrhea.    Eyes:  Negative for photophobia and visual disturbance.   Respiratory:  Negative for cough, shortness of breath and wheezing.    Cardiovascular:  Negative for chest pain and palpitations.   Gastrointestinal:  Negative for constipation, diarrhea, nausea and vomiting.   Genitourinary:  Negative for dysuria and frequency.   Musculoskeletal:  Negative for arthralgias and myalgias.   Skin:  Negative for rash.   Neurological:  Negative for dizziness, light-headedness and headaches.   Hematological:  Negative for adenopathy.   Psychiatric/Behavioral:  Negative for dysphoric mood and sleep disturbance. The patient is not nervous/anxious.      Pertinent Medical History            Medical History Reviewed by provider this encounter:  Tobacco  Allergies  Meds  Problems  Med Hx  Surg Hx  Fam Hx     .  Past Medical History   Past Medical History[1]  Past Surgical History[2]  Family History[3]   reports that he has never smoked. He has never used smokeless tobacco. He reports current alcohol use. He reports that he does not use drugs.  No current outpatient medicationsAllergies[4]   Medications Ordered Prior  "to Encounter[5]   Social History[6]    Objective   /88 (BP Location: Right arm, Patient Position: Sitting, Cuff Size: Large)   Pulse 74   Temp (!) 97.2 °F (36.2 °C) (Temporal)   Ht 5' 8.5\" (1.74 m)   Wt 104 kg (230 lb)   SpO2 98%   BMI 34.46 kg/m²     Physical Exam  Vitals and nursing note reviewed.   Constitutional:       Appearance: Normal appearance.   HENT:      Head: Normocephalic and atraumatic.      Right Ear: Tympanic membrane, ear canal and external ear normal.      Left Ear: Tympanic membrane, ear canal and external ear normal.      Nose: Nose normal.      Mouth/Throat:      Mouth: Mucous membranes are moist.     Eyes:      Conjunctiva/sclera: Conjunctivae normal.       Cardiovascular:      Rate and Rhythm: Normal rate and regular rhythm.      Heart sounds: Normal heart sounds.   Pulmonary:      Effort: Pulmonary effort is normal.      Breath sounds: Normal breath sounds.   Abdominal:      Palpations: Abdomen is soft.     Musculoskeletal:         General: Normal range of motion.      Cervical back: Normal range of motion and neck supple.     Skin:     General: Skin is warm and dry.      Capillary Refill: Capillary refill takes less than 2 seconds.     Neurological:      General: No focal deficit present.      Mental Status: He is alert and oriented to person, place, and time.     Psychiatric:         Mood and Affect: Mood normal.         Behavior: Behavior normal.         Thought Content: Thought content normal.         Judgment: Judgment normal.              [1]   Past Medical History:  Diagnosis Date    Asthma    [2]   Past Surgical History:  Procedure Laterality Date    NO PAST SURGERIES     [3]   Family History  Problem Relation Name Age of Onset    Hypertension Mother      Breast cancer Family      Colon cancer Family      Pancreatic cancer Family     [4] No Known Allergies  [5]   Current Outpatient Medications on File Prior to Visit   Medication Sig Dispense Refill    [DISCONTINUED] " clobetasol (TEMOVATE) 0.05 % cream  (Patient not taking: Reported on 12/7/2021)      [DISCONTINUED] naproxen (Naprosyn) 500 mg tablet Take 1 tablet (500 mg total) by mouth 2 (two) times a day with meals (Patient not taking: Reported on 11/16/2023) 28 tablet 0     No current facility-administered medications on file prior to visit.   [6]   Social History  Tobacco Use    Smoking status: Never    Smokeless tobacco: Never   Vaping Use    Vaping status: Former   Substance and Sexual Activity    Alcohol use: Yes     Comment: Occasional    Drug use: No    Sexual activity: Yes     Partners: Female

## 2025-07-03 ENCOUNTER — HOSPITAL ENCOUNTER (OUTPATIENT)
Dept: RADIOLOGY | Facility: HOSPITAL | Age: 25
Discharge: HOME | End: 2025-07-03
Payer: COMMERCIAL

## 2025-07-03 DIAGNOSIS — R06.02 SHORTNESS OF BREATH: ICD-10-CM

## 2025-07-03 DIAGNOSIS — R06.2 WHEEZING: ICD-10-CM

## 2025-07-03 PROCEDURE — 71046 X-RAY EXAM CHEST 2 VIEWS: CPT

## 2025-08-05 ENCOUNTER — CONSULT (OUTPATIENT)
Dept: PULMONOLOGY | Facility: CLINIC | Age: 25
End: 2025-08-05
Payer: COMMERCIAL

## 2025-08-05 VITALS
OXYGEN SATURATION: 98 % | HEART RATE: 61 BPM | TEMPERATURE: 96.5 F | HEIGHT: 68 IN | BODY MASS INDEX: 34.37 KG/M2 | SYSTOLIC BLOOD PRESSURE: 126 MMHG | WEIGHT: 226.8 LBS | DIASTOLIC BLOOD PRESSURE: 66 MMHG

## 2025-08-05 DIAGNOSIS — Z78.9 NON-SMOKER: Primary | ICD-10-CM

## 2025-08-05 DIAGNOSIS — R06.02 SHORTNESS OF BREATH: ICD-10-CM

## 2025-08-05 DIAGNOSIS — R06.2 WHEEZING: ICD-10-CM

## 2025-08-05 DIAGNOSIS — J45.990 EXERCISE-INDUCED ASTHMA: ICD-10-CM

## 2025-08-05 PROCEDURE — 95012 NITRIC OXIDE EXP GAS DETER: CPT | Performed by: STUDENT IN AN ORGANIZED HEALTH CARE EDUCATION/TRAINING PROGRAM

## 2025-08-05 PROCEDURE — 99243 OFF/OP CNSLTJ NEW/EST LOW 30: CPT | Performed by: STUDENT IN AN ORGANIZED HEALTH CARE EDUCATION/TRAINING PROGRAM

## 2025-08-05 RX ORDER — ALBUTEROL SULFATE 90 UG/1
2 INHALANT RESPIRATORY (INHALATION) EVERY 6 HOURS PRN
Qty: 18 G | Refills: 3 | Status: SHIPPED | OUTPATIENT
Start: 2025-08-05